# Patient Record
Sex: FEMALE | Employment: OTHER | URBAN - METROPOLITAN AREA
[De-identification: names, ages, dates, MRNs, and addresses within clinical notes are randomized per-mention and may not be internally consistent; named-entity substitution may affect disease eponyms.]

---

## 2022-11-23 ENCOUNTER — HOSPITAL ENCOUNTER (INPATIENT)
Age: 72
LOS: 4 days | Discharge: HOME | DRG: 481 | End: 2022-11-27
Attending: EMERGENCY MEDICINE | Admitting: FAMILY MEDICINE
Payer: OTHER GOVERNMENT

## 2022-11-23 ENCOUNTER — APPOINTMENT (OUTPATIENT)
Dept: CT IMAGING | Age: 72
DRG: 481 | End: 2022-11-23
Payer: OTHER GOVERNMENT

## 2022-11-23 ENCOUNTER — APPOINTMENT (OUTPATIENT)
Dept: GENERAL RADIOLOGY | Age: 72
DRG: 481 | End: 2022-11-23
Payer: OTHER GOVERNMENT
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
## 2022-11-23 DIAGNOSIS — S72.141A CLOSED DISPLACED INTERTROCHANTERIC FRACTURE OF RIGHT FEMUR, INITIAL ENCOUNTER (HCC): ICD-10-CM

## 2022-11-23 PROBLEM — D72.829 LEUKOCYTOSIS: Status: ACTIVE | Noted: 2022-11-23

## 2022-11-23 PROBLEM — E78.5 HYPERLIPIDEMIA: Status: ACTIVE | Noted: 2022-11-23

## 2022-11-23 PROBLEM — E87.20 ACIDOSIS: Status: ACTIVE | Noted: 2022-11-23

## 2022-11-23 LAB
ABO/RH: NORMAL
ALBUMIN SERPL-MCNC: 4.3 G/DL (ref 3.5–5.2)
ALP BLD-CCNC: 54 U/L (ref 35–104)
ALT SERPL-CCNC: 17 U/L (ref 0–32)
ANION GAP SERPL CALCULATED.3IONS-SCNC: 10 MMOL/L (ref 7–16)
ANTIBODY SCREEN: NORMAL
ANTIBODY SCREEN: NORMAL
AST SERPL-CCNC: 26 U/L (ref 0–31)
BASOPHILS ABSOLUTE: 0.03 E9/L (ref 0–0.2)
BASOPHILS RELATIVE PERCENT: 0.2 % (ref 0–2)
BILIRUB SERPL-MCNC: 0.3 MG/DL (ref 0–1.2)
BUN BLDV-MCNC: 15 MG/DL (ref 6–23)
CALCIUM SERPL-MCNC: 9.3 MG/DL (ref 8.6–10.2)
CHLORIDE BLD-SCNC: 107 MMOL/L (ref 98–107)
CO2: 20 MMOL/L (ref 22–29)
CREAT SERPL-MCNC: 0.8 MG/DL (ref 0.5–1)
EKG ATRIAL RATE: 64 BPM
EKG P AXIS: 38 DEGREES
EKG P-R INTERVAL: 124 MS
EKG Q-T INTERVAL: 428 MS
EKG QRS DURATION: 90 MS
EKG QTC CALCULATION (BAZETT): 441 MS
EKG R AXIS: 50 DEGREES
EKG T AXIS: 45 DEGREES
EKG VENTRICULAR RATE: 64 BPM
EOSINOPHILS ABSOLUTE: 0.03 E9/L (ref 0.05–0.5)
EOSINOPHILS RELATIVE PERCENT: 0.2 % (ref 0–6)
GFR SERPL CREATININE-BSD FRML MDRD: >60 ML/MIN/1.73
GLUCOSE BLD-MCNC: 120 MG/DL (ref 74–99)
HCT VFR BLD CALC: 40.2 % (ref 34–48)
HEMOGLOBIN: 13.6 G/DL (ref 11.5–15.5)
IMMATURE GRANULOCYTES #: 0.09 E9/L
IMMATURE GRANULOCYTES %: 0.6 % (ref 0–5)
INR BLD: 1
LYMPHOCYTES ABSOLUTE: 1.53 E9/L (ref 1.5–4)
LYMPHOCYTES RELATIVE PERCENT: 10.3 % (ref 20–42)
MCH RBC QN AUTO: 33.2 PG (ref 26–35)
MCHC RBC AUTO-ENTMCNC: 33.8 % (ref 32–34.5)
MCV RBC AUTO: 98 FL (ref 80–99.9)
MONOCYTES ABSOLUTE: 0.52 E9/L (ref 0.1–0.95)
MONOCYTES RELATIVE PERCENT: 3.5 % (ref 2–12)
NEUTROPHILS ABSOLUTE: 12.69 E9/L (ref 1.8–7.3)
NEUTROPHILS RELATIVE PERCENT: 85.2 % (ref 43–80)
PDW BLD-RTO: 12.9 FL (ref 11.5–15)
PLATELET # BLD: 256 E9/L (ref 130–450)
PMV BLD AUTO: 9.3 FL (ref 7–12)
POTASSIUM SERPL-SCNC: 4.4 MMOL/L (ref 3.5–5)
PROTHROMBIN TIME: 11.1 SEC (ref 9.3–12.4)
RBC # BLD: 4.1 E12/L (ref 3.5–5.5)
SODIUM BLD-SCNC: 137 MMOL/L (ref 132–146)
TOTAL CK: 124 U/L (ref 20–180)
TOTAL PROTEIN: 7 G/DL (ref 6.4–8.3)
WBC # BLD: 14.9 E9/L (ref 4.5–11.5)

## 2022-11-23 PROCEDURE — 96374 THER/PROPH/DIAG INJ IV PUSH: CPT

## 2022-11-23 PROCEDURE — 85025 COMPLETE CBC W/AUTO DIFF WBC: CPT

## 2022-11-23 PROCEDURE — 80053 COMPREHEN METABOLIC PANEL: CPT

## 2022-11-23 PROCEDURE — 1200000000 HC SEMI PRIVATE

## 2022-11-23 PROCEDURE — 86901 BLOOD TYPING SEROLOGIC RH(D): CPT

## 2022-11-23 PROCEDURE — 85610 PROTHROMBIN TIME: CPT

## 2022-11-23 PROCEDURE — 99223 1ST HOSP IP/OBS HIGH 75: CPT | Performed by: INTERNAL MEDICINE

## 2022-11-23 PROCEDURE — 93010 ELECTROCARDIOGRAM REPORT: CPT | Performed by: INTERNAL MEDICINE

## 2022-11-23 PROCEDURE — 99285 EMERGENCY DEPT VISIT HI MDM: CPT

## 2022-11-23 PROCEDURE — 6360000002 HC RX W HCPCS: Performed by: INTERNAL MEDICINE

## 2022-11-23 PROCEDURE — 82550 ASSAY OF CK (CPK): CPT

## 2022-11-23 PROCEDURE — 71045 X-RAY EXAM CHEST 1 VIEW: CPT

## 2022-11-23 PROCEDURE — 36415 COLL VENOUS BLD VENIPUNCTURE: CPT

## 2022-11-23 PROCEDURE — 86850 RBC ANTIBODY SCREEN: CPT

## 2022-11-23 PROCEDURE — 73502 X-RAY EXAM HIP UNI 2-3 VIEWS: CPT

## 2022-11-23 PROCEDURE — 6360000002 HC RX W HCPCS

## 2022-11-23 PROCEDURE — 6360000002 HC RX W HCPCS: Performed by: EMERGENCY MEDICINE

## 2022-11-23 PROCEDURE — 96375 TX/PRO/DX INJ NEW DRUG ADDON: CPT

## 2022-11-23 PROCEDURE — 2580000003 HC RX 258: Performed by: GENERAL ACUTE CARE HOSPITAL

## 2022-11-23 PROCEDURE — 86900 BLOOD TYPING SEROLOGIC ABO: CPT

## 2022-11-23 PROCEDURE — 93005 ELECTROCARDIOGRAM TRACING: CPT | Performed by: EMERGENCY MEDICINE

## 2022-11-23 RX ORDER — POLYETHYLENE GLYCOL 3350 17 G/17G
17 POWDER, FOR SOLUTION ORAL DAILY PRN
Status: DISCONTINUED | OUTPATIENT
Start: 2022-11-23 | End: 2022-11-24 | Stop reason: SDUPTHER

## 2022-11-23 RX ORDER — SODIUM CHLORIDE 9 MG/ML
INJECTION, SOLUTION INTRAVENOUS PRN
Status: DISCONTINUED | OUTPATIENT
Start: 2022-11-23 | End: 2022-11-27 | Stop reason: HOSPADM

## 2022-11-23 RX ORDER — LANOLIN ALCOHOL/MO/W.PET/CERES
100 CREAM (GRAM) TOPICAL DAILY
Status: DISCONTINUED | OUTPATIENT
Start: 2022-11-23 | End: 2022-11-27 | Stop reason: HOSPADM

## 2022-11-23 RX ORDER — ACETAMINOPHEN 325 MG/1
650 TABLET ORAL EVERY 6 HOURS PRN
Status: DISCONTINUED | OUTPATIENT
Start: 2022-11-23 | End: 2022-11-27 | Stop reason: HOSPADM

## 2022-11-23 RX ORDER — ACETAMINOPHEN 650 MG/1
650 SUPPOSITORY RECTAL EVERY 6 HOURS PRN
Status: DISCONTINUED | OUTPATIENT
Start: 2022-11-23 | End: 2022-11-27 | Stop reason: HOSPADM

## 2022-11-23 RX ORDER — SODIUM CHLORIDE 0.9 % (FLUSH) 0.9 %
5-40 SYRINGE (ML) INJECTION EVERY 12 HOURS SCHEDULED
Status: DISCONTINUED | OUTPATIENT
Start: 2022-11-23 | End: 2022-11-24

## 2022-11-23 RX ORDER — THIAMINE HYDROCHLORIDE 100 MG/ML
100 INJECTION, SOLUTION INTRAMUSCULAR; INTRAVENOUS ONCE
Status: COMPLETED | OUTPATIENT
Start: 2022-11-23 | End: 2022-11-23

## 2022-11-23 RX ORDER — ONDANSETRON 4 MG/1
4 TABLET, ORALLY DISINTEGRATING ORAL EVERY 8 HOURS PRN
Status: DISCONTINUED | OUTPATIENT
Start: 2022-11-23 | End: 2022-11-24 | Stop reason: SDUPTHER

## 2022-11-23 RX ORDER — SODIUM CHLORIDE 0.9 % (FLUSH) 0.9 %
5-40 SYRINGE (ML) INJECTION PRN
Status: DISCONTINUED | OUTPATIENT
Start: 2022-11-23 | End: 2022-11-24

## 2022-11-23 RX ORDER — MULTIVIT-MIN/IRON/FOLIC ACID/K 18-600-40
CAPSULE ORAL
COMMUNITY

## 2022-11-23 RX ORDER — FENTANYL CITRATE 50 UG/ML
50 INJECTION, SOLUTION INTRAMUSCULAR; INTRAVENOUS ONCE
Status: COMPLETED | OUTPATIENT
Start: 2022-11-23 | End: 2022-11-23

## 2022-11-23 RX ORDER — SODIUM CHLORIDE 0.9 % (FLUSH) 0.9 %
5-40 SYRINGE (ML) INJECTION EVERY 12 HOURS SCHEDULED
Status: DISCONTINUED | OUTPATIENT
Start: 2022-11-23 | End: 2022-11-27 | Stop reason: HOSPADM

## 2022-11-23 RX ORDER — ATORVASTATIN CALCIUM 20 MG/1
20 TABLET, FILM COATED ORAL DAILY
COMMUNITY

## 2022-11-23 RX ORDER — ONDANSETRON 2 MG/ML
4 INJECTION INTRAMUSCULAR; INTRAVENOUS EVERY 6 HOURS PRN
Status: DISCONTINUED | OUTPATIENT
Start: 2022-11-23 | End: 2022-11-24 | Stop reason: SDUPTHER

## 2022-11-23 RX ADMIN — FENTANYL CITRATE 50 MCG: 50 INJECTION INTRAMUSCULAR; INTRAVENOUS at 11:34

## 2022-11-23 RX ADMIN — THIAMINE HYDROCHLORIDE 100 MG: 100 INJECTION, SOLUTION INTRAMUSCULAR; INTRAVENOUS at 16:21

## 2022-11-23 RX ADMIN — HYDROMORPHONE HYDROCHLORIDE 0.5 MG: 0.5 INJECTION, SOLUTION INTRAMUSCULAR; INTRAVENOUS; SUBCUTANEOUS at 18:04

## 2022-11-23 RX ADMIN — SODIUM CHLORIDE, PRESERVATIVE FREE 10 ML: 5 INJECTION INTRAVENOUS at 20:59

## 2022-11-23 RX ADMIN — HYDROMORPHONE HYDROCHLORIDE 1 MG: 1 INJECTION, SOLUTION INTRAMUSCULAR; INTRAVENOUS; SUBCUTANEOUS at 14:56

## 2022-11-23 RX ADMIN — HYDROMORPHONE HYDROCHLORIDE 1 MG: 1 INJECTION, SOLUTION INTRAMUSCULAR; INTRAVENOUS; SUBCUTANEOUS at 12:34

## 2022-11-23 RX ADMIN — HYDROMORPHONE HYDROCHLORIDE 0.5 MG: 0.5 INJECTION, SOLUTION INTRAMUSCULAR; INTRAVENOUS; SUBCUTANEOUS at 21:00

## 2022-11-23 ASSESSMENT — ENCOUNTER SYMPTOMS
CONSTIPATION: 0
BLOOD IN STOOL: 0
ABDOMINAL PAIN: 0
DIARRHEA: 0
SHORTNESS OF BREATH: 0
VOMITING: 0
CHEST TIGHTNESS: 0
NAUSEA: 0

## 2022-11-23 ASSESSMENT — PAIN DESCRIPTION - LOCATION
LOCATION: HIP

## 2022-11-23 ASSESSMENT — LIFESTYLE VARIABLES
HOW OFTEN DO YOU HAVE A DRINK CONTAINING ALCOHOL: 4 OR MORE TIMES A WEEK
HOW MANY STANDARD DRINKS CONTAINING ALCOHOL DO YOU HAVE ON A TYPICAL DAY: 1 OR 2

## 2022-11-23 ASSESSMENT — PAIN DESCRIPTION - ORIENTATION
ORIENTATION: RIGHT

## 2022-11-23 ASSESSMENT — PAIN SCALES - GENERAL
PAINLEVEL_OUTOF10: 9
PAINLEVEL_OUTOF10: 10
PAINLEVEL_OUTOF10: 9
PAINLEVEL_OUTOF10: 10
PAINLEVEL_OUTOF10: 10

## 2022-11-23 ASSESSMENT — PAIN DESCRIPTION - DESCRIPTORS
DESCRIPTORS: ACHING
DESCRIPTORS: SORE;ACHING

## 2022-11-23 NOTE — H&P
[place] [id number]  Resident History and Physical      CHIEF COMPLAINT:    Chief Complaint   Patient presents with    Loss of Consciousness     Pt became dizzy and had a syncopal episode. Denies hitting head. Now C/O right hip pain. History of Present Illness:   Manish Wesley  is a 67 y.o. female patient of [facility]  with a pertinent PMHx of hyperlipidemia, tobacco abuse, daily alcohol use who presented to the ER from home with granddaughter with chief complaint of loss of consciousness. Patient states that she was at home, which is being renovated, and was walking through the kitchen when she tripped on a board lying on the ground falling and landing on her right hip. She is adamant that she DID NOT lose consciousness. She denies any presyncopal symptoms or any. Of not being fully alert and aware. She did however, tried to stand after her fall and states that the pain was so severe she became lightheaded and nauseous and felt as if she was going to faint but did not. She denies any head trauma during the fall. She denies any pain anywhere other than the right hip. Pain is exacerbated by movement and does not radiate. She is not having any pain in her back or bowel/bladder incontinence. She denies any other acute concerns. ED course: Vitals were remarkable for elevated blood pressure 148/77. Labs were remarkable for leukocytosis of 14.9. CXR was remarkable for normal chest x-ray. X-ray of the right hip demonstrated mildly displaced intertrochanteric fracture with mild varus angulation. EKG was remarkable for normal EKG, normal sinus rhythm. Patient did receive fentanyl 50 mcg x 1 as well as Dilaudid 1 mg x 1 in the emergency department. Additionally, she refused CT of the head in the emergency department as she stated she did not have any head trauma and did not feel that she needed that image.   Internal Medicine was consulted to admit the patient for fracture of the right hip    ROS:     Review of Systems   Constitutional:  Negative for chills and fever. Eyes:  Negative for visual disturbance. Respiratory:  Negative for chest tightness and shortness of breath. Cardiovascular:  Negative for chest pain and palpitations. Gastrointestinal:  Negative for abdominal pain, blood in stool, constipation, diarrhea, nausea and vomiting. Genitourinary:  Negative for dysuria and hematuria. Musculoskeletal:  Positive for arthralgias and myalgias. Negative for neck pain. Skin:  Negative for wound. Neurological:  Negative for dizziness, syncope and light-headedness. Past Medical History:   Diagnosis Date    Hyperlipidemia          Past Surgical History:   Procedure Laterality Date    NECK MASS EXCISION Left     Around 1972       Medications Prior to Admission:    Prior to Admission medications    Not on File        Allergies:   Patient has no known allergies. Social History:    reports that she has been smoking cigarettes. She has a 25.00 pack-year smoking history. She does not have any smokeless tobacco history on file. She reports current alcohol use. She reports that she does not use drugs. Family History:   family history is not on file. PHYSICAL EXAM:    Vitals:  BP (!) 148/77   Pulse 73   Temp 97.8 °F (36.6 °C)   Resp 17   Wt 144 lb (65.3 kg)   SpO2 98%     Physical Exam  Vitals reviewed. Constitutional:       General: She is not in acute distress. Appearance: Normal appearance. HENT:      Head: Normocephalic. Right Ear: External ear normal.      Left Ear: External ear normal.      Nose:      Comments: Small abrasion over tip of the nose     Mouth/Throat:      Mouth: Mucous membranes are moist.   Eyes:      General: No scleral icterus. Extraocular Movements: Extraocular movements intact. Conjunctiva/sclera: Conjunctivae normal.   Cardiovascular:      Rate and Rhythm: Normal rate and regular rhythm.       Heart sounds: Normal heart sounds. No murmur heard. No friction rub. No gallop. Pulmonary:      Effort: Pulmonary effort is normal.      Breath sounds: Normal breath sounds. No wheezing or rales. Abdominal:      General: Abdomen is flat. Palpations: Abdomen is soft. There is no mass. Tenderness: There is no abdominal tenderness. There is no guarding. Musculoskeletal:         General: Signs of injury present. Normal range of motion. Cervical back: Normal range of motion and neck supple. Right lower leg: No edema. Left lower leg: No edema. Comments: Creased range of motion right hip, pain on movement secondary to confirmed fracture on imaging   Skin:     General: Skin is warm and dry. Neurological:      General: No focal deficit present. Mental Status: She is alert.    Psychiatric:         Mood and Affect: Mood normal.       LABS:  Recent Results (from the past 24 hour(s))   EKG 12 Lead    Collection Time: 11/23/22 12:05 PM   Result Value Ref Range    Ventricular Rate 64 BPM    Atrial Rate 64 BPM    P-R Interval 124 ms    QRS Duration 90 ms    Q-T Interval 428 ms    QTc Calculation (Bazett) 441 ms    P Axis 38 degrees    R Axis 50 degrees    T Axis 45 degrees   CMP    Collection Time: 11/23/22 12:09 PM   Result Value Ref Range    Sodium 137 132 - 146 mmol/L    Potassium 4.4 3.5 - 5.0 mmol/L    Chloride 107 98 - 107 mmol/L    CO2 20 (L) 22 - 29 mmol/L    Anion Gap 10 7 - 16 mmol/L    Glucose 120 (H) 74 - 99 mg/dL    BUN 15 6 - 23 mg/dL    Creatinine 0.8 0.5 - 1.0 mg/dL    Est, Glom Filt Rate >60 >=60 mL/min/1.73    Calcium 9.3 8.6 - 10.2 mg/dL    Total Protein 7.0 6.4 - 8.3 g/dL    Albumin 4.3 3.5 - 5.2 g/dL    Total Bilirubin 0.3 0.0 - 1.2 mg/dL    Alkaline Phosphatase 54 35 - 104 U/L    ALT 17 0 - 32 U/L    AST 26 0 - 31 U/L   CBC with Auto Differential    Collection Time: 11/23/22 12:09 PM   Result Value Ref Range    WBC 14.9 (H) 4.5 - 11.5 E9/L    RBC 4.10 3.50 - 5.50 E12/L    Hemoglobin 13.6 11.5 - 15.5 g/dL    Hematocrit 40.2 34.0 - 48.0 %    MCV 98.0 80.0 - 99.9 fL    MCH 33.2 26.0 - 35.0 pg    MCHC 33.8 32.0 - 34.5 %    RDW 12.9 11.5 - 15.0 fL    Platelets 642 017 - 407 E9/L    MPV 9.3 7.0 - 12.0 fL    Neutrophils % 85.2 (H) 43.0 - 80.0 %    Immature Granulocytes % 0.6 0.0 - 5.0 %    Lymphocytes % 10.3 (L) 20.0 - 42.0 %    Monocytes % 3.5 2.0 - 12.0 %    Eosinophils % 0.2 0.0 - 6.0 %    Basophils % 0.2 0.0 - 2.0 %    Neutrophils Absolute 12.69 (H) 1.80 - 7.30 E9/L    Immature Granulocytes # 0.09 E9/L    Lymphocytes Absolute 1.53 1.50 - 4.00 E9/L    Monocytes Absolute 0.52 0.10 - 0.95 E9/L    Eosinophils Absolute 0.03 (L) 0.05 - 0.50 E9/L    Basophils Absolute 0.03 0.00 - 0.20 E9/L   Protime-INR    Collection Time: 11/23/22 12:09 PM   Result Value Ref Range    Protime 11.1 9.3 - 12.4 sec    INR 1.0    TYPE AND SCREEN    Collection Time: 11/23/22 12:15 PM   Result Value Ref Range    ABO/Rh B POS     Antibody Screen NEG        CT Head W/O Contrast         XR CHEST PORTABLE   Final Result   No acute process. XR HIP 2-3 VW W PELVIS RIGHT   Final Result   Mildly displaced intertrochanteric fracture right hip with mild varus   angulation.              ASSESSMENT/PLAN:      Active Hospital Problems    Diagnosis Date Noted    Intertrochanteric fracture of right femur, closed, initial encounter [facility]) Merlinda Eke 11/23/2022     Priority: Medium       Intertrochanteric fracture right hip  Hold DVT prophylaxis for now, defer to orthopedic surgery  Keep diet n.p.o. for now, if patient is to wait until tomorrow for surgery may eat dinner and be n.p.o. after midnight  Pain control with Dilaudid 0.5 mg every 3 hours as needed, defer any alterations in pain control orders to orthopedic service  Incentive spirometer to optimize pulmonary function as best as possible prior to surgery as well as afterwards to prevent atelectasis  Strict bedrest for now, defer further ambulatory needs and guidelines to orthopedic surgery  Orthopedic surgery following, appreciate input    Leukocytosis  White blood cells 14.9 on admission labs, likely inflammatory versus infectious versus atelectasis  Patient without symptoms or physical exam concerning for infection or atelectasis, will continue to monitor with daily CBC    Alcohol use disorder  Patient drinks alcohol daily, endorses greater than 7 beers per week. However, she denies ever having any withdrawal symptoms and does not appear tremulous on exam.  Will order CIWA protocol to ensure patient does not have any seizures or withdrawal during hospitalization. Pain Control: Dilaudid 0.5 mg every 3 hours as needed  DVT ppx: None, defer to orthopedic surgery  GI ppx: None  Code Status: Full  Diet: NPO      Case discussed with attending, Dr. Rufino Almaraz MD   Family Medicine Resident Physician PGY-1  11/23/2022   HOSPITALIST ATTENDING PHYSICIAN NOTE 11/23/2022 1626PM:    Details of the evaluation - subjective assessment (including medication profile, past medical, family and social history when applicable), examination, review of lab and test data, diagnostic impressions and medical decision making - performed by Rubina Mao MD, were discussed with me on the date of service and I agree with clinical information herein unless otherwise noted. The patient has been evaluated by me personally earlier today. Pt reports no fevers, chills,n/v. PHYSICAL EXAM:    Vitals:  BP (!) 142/67   Pulse 75   Temp 97.3 °F (36.3 °C) (Oral)   Resp 16   Wt 144 lb (65.3 kg)   SpO2 97%     General:  Appears comfortable. Answers questions appropriately and cooperative with exam  HEENT:  Mucous membranes moist. No erythema, rhinorrhea, or post-nasal drip noted. Neck:  No carotid bruits. Heart:  Rhythm regular at rate of 76  Lungs:  CTA. No wheeze, rales, or rhonchi  Abdomen:  Positive bowel sounds positive. Soft. Non-tender.  No guarding, rebound or rigidity. Breast/Rectal/Genitourinary: not pertinent. Extremities:  Negative for lower extremity edema  Skin:  Warm and dry  Vascular: 2/4 Dorsalis Pedis pulses bilaterally. Neuro:  limited due to pt's status but otherwise no focal changes noted. I agree with the assessment and plan of Collin Adame MD    Right mildly displaced intertrochanteric fx of right hip  Leukocytosis  acidosis  Alcohol abuse  Hyperlipidemia  Elevated bp without dx of htn    Pt denies any active cp or cardiac symptoms. Pt's recent smoking of today does prevent some risk from pulmonary standpoint but would not delay surgery at this time. Will monitor pt closely for any changes. Electronically signed by Nereida Reaves D.O.   Hospitalist  [facility] at [facility]

## 2022-11-23 NOTE — ED NOTES
Pts band was verified and then afterwards the patient realized her birth date was wrong after verifying with her.       Savita D eLeón, MAURICIO  11/23/22 [id number]

## 2022-11-23 NOTE — ED PROVIDER NOTES
Department of Emergency Medicine   ED  Provider Note  Admit Date/RoomTime: 11/23/2022 11:11 AM  ED Room: 22/22          History of Present Illness:  11/23/22, Time: 11:20 AM EILEEN García is a 67 y.o. female presenting to the ED for right hip pain after a fall, beginning less than 1 hour prior to arrival.  The complaint has been persistent, severe in severity, and worsened by movement of right leg. Patient states that she was walking through the kitchen when she tripped and fell forward and landed on her right hip. Patient states that she immediately felt pain to the right hip, does not feel that she struck her head nor did she lose consciousness. She stated that when she was trying to get up the pain became so severe that she did briefly pass out at that time. Family summoned EMS and patient was brought to ED. She is complaining of a right hip pain with no significant radiation but worsens with any movement of the right leg. She denies any neck or back pain, no headache. Review of Systems:   Pertinent positives and negatives are stated within HPI, all other systems reviewed and are negative.        --------------------------------------------- PAST HISTORY ---------------------------------------------  Past Medical History:  has no past medical history on file. Past Surgical History:  has no past surgical history on file. Social History:      Family History: family history is not on file. . Unless otherwise noted, family history is non contributory    The patients home medications have been reviewed. Allergies: Patient has no known allergies.         ---------------------------------------------------PHYSICAL EXAM--------------------------------------    Constitutional/General: Alert and oriented x3  Head: Normocephalic and atraumatic  Eyes: PERRL, EOMI, sclera non icteric  Mouth: Oropharynx clear, handling secretions, no trismus, no asymmetry of the posterior oropharynx or uvular edema  Neck: Supple, full ROM, no stridor, no meningeal signs  Respiratory: Lungs clear to auscultation bilaterally, no wheezes, rales, or rhonchi. Not in respiratory distress  Cardiovascular:  Regular rate. Regular rhythm. No murmurs, no aortic murmurs, no gallops, or rubs. 2+ distal pulses. Equal extremity pulses. Chest: No chest wall tenderness  GI:  Abdomen Soft, Non tender, Non distended. No rebound, guarding, or rigidity. No pulsatile masses. Musculoskeletal: Shortening and outward rotation of the right lower extremity, tenderness to the right anterior and lateral hip. Warm and well perfused, no clubbing, cyanosis, or edema. Capillary refill <3 seconds  Integument: skin warm and dry. No rashes. Neurologic: GCS 15, no focal deficits, symmetric strength 5/5 in the upper and lower extremities bilaterally  Psychiatric: Normal Affect          -------------------------------------------------- RESULTS -------------------------------------------------  I have personally reviewed all laboratory and imaging results for this patient. Results are listed below.      LABS: (Lab results interpreted by me)  Results for orders placed or performed during the hospital encounter of 11/23/22   CMP   Result Value Ref Range    Sodium 137 132 - 146 mmol/L    Potassium 4.4 3.5 - 5.0 mmol/L    Chloride 107 98 - 107 mmol/L    CO2 20 (L) 22 - 29 mmol/L    Anion Gap 10 7 - 16 mmol/L    Glucose 120 (H) 74 - 99 mg/dL    BUN 15 6 - 23 mg/dL    Creatinine 0.8 0.5 - 1.0 mg/dL    Est, Glom Filt Rate >60 >=60 mL/min/1.73    Calcium 9.3 8.6 - 10.2 mg/dL    Total Protein 7.0 6.4 - 8.3 g/dL    Albumin 4.3 3.5 - 5.2 g/dL    Total Bilirubin 0.3 0.0 - 1.2 mg/dL    Alkaline Phosphatase 54 35 - 104 U/L    ALT 17 0 - 32 U/L    AST 26 0 - 31 U/L   CBC with Auto Differential   Result Value Ref Range    WBC 14.9 (H) 4.5 - 11.5 E9/L    RBC 4.10 3.50 - 5.50 E12/L    Hemoglobin 13.6 11.5 - 15.5 g/dL    Hematocrit 40.2 34.0 - 48.0 %    MCV 98.0 80.0 - 99.9 fL    MCH 33.2 26.0 - 35.0 pg    MCHC 33.8 32.0 - 34.5 %    RDW 12.9 11.5 - 15.0 fL    Platelets 464 304 - 679 E9/L    MPV 9.3 7.0 - 12.0 fL    Neutrophils % 85.2 (H) 43.0 - 80.0 %    Immature Granulocytes % 0.6 0.0 - 5.0 %    Lymphocytes % 10.3 (L) 20.0 - 42.0 %    Monocytes % 3.5 2.0 - 12.0 %    Eosinophils % 0.2 0.0 - 6.0 %    Basophils % 0.2 0.0 - 2.0 %    Neutrophils Absolute 12.69 (H) 1.80 - 7.30 E9/L    Immature Granulocytes # 0.09 E9/L    Lymphocytes Absolute 1.53 1.50 - 4.00 E9/L    Monocytes Absolute 0.52 0.10 - 0.95 E9/L    Eosinophils Absolute 0.03 (L) 0.05 - 0.50 E9/L    Basophils Absolute 0.03 0.00 - 0.20 E9/L   EKG 12 Lead   Result Value Ref Range    Ventricular Rate 64 BPM    Atrial Rate 64 BPM    P-R Interval 124 ms    QRS Duration 90 ms    Q-T Interval 428 ms    QTc Calculation (Bazett) 441 ms    P Axis 38 degrees    R Axis 50 degrees    T Axis 45 degrees   ,       RADIOLOGY:  Interpreted by Radiologist unless otherwise specified  XR CHEST PORTABLE   Final Result   No acute process. XR HIP 2-3 VW W PELVIS RIGHT   Final Result   Mildly displaced intertrochanteric fracture right hip with mild varus   angulation.          CT Head W/O Contrast    (Results Pending)         EKG Interpretation  Interpreted by emergency department physician, Dr. Elia Guzman    Date of EK22  Time: 1205    Rhythm: normal sinus   Rate: 64  Axis: normal  Conduction: normal  ST Segments: no acute change  T Waves: no acute change    Clinical Impression: No findings suggestive of acute ischemia or injury  Comparison to prior EKG: no previous EKG      ------------------------- NURSING NOTES AND VITALS REVIEWED ---------------------------   The nursing notes within the ED encounter and vital signs as below have been reviewed by myself  BP (!) 141/66   Pulse 61   Temp 97.8 °F (36.6 °C)   Wt 144 lb (65.3 kg)     Oxygen Saturation Interpretation: Normal    The patients available past medical records and past encounters were reviewed. ------------------------------ ED COURSE/MEDICAL DECISION MAKING----------------------  Medications   fentaNYL (SUBLIMAZE) injection 50 mcg (50 mcg IntraVENous Given 11/23/22 1134)   HYDROmorphone (DILAUDID) injection 1 mg (1 mg IntraVENous Given 11/23/22 1234)           The cardiac monitor revealed sinus rhythm with a heart rate in the 60s as interpreted by me. The cardiac monitor was ordered secondary to the patient's chest pain and to monitor for patient for dysrhythmia. CPT [id number]           Medical Decision Making:     I, Dr. Rasheeda Pineda, am the primary provider of record    68-year-old female presenting with right hip pain after mechanical fall. Patient did have a syncopal episode after she tried to move and get up, stating that the pain became significantly more intense which caused her to briefly lose consciousness. She arrives neurologically intact and hemodynamically stable. Concern for acute fracture as her right lower extremity is shortened and rotated. Laboratory testing, EKG, chest x-ray and CT head were performed not so much for the syncopal episode due to pain but preoperative clearance. EKG shows no findings suggestive of acute ischemia or injury. Chest x-ray is unremarkable. X-ray of the hip shows a mildly displaced intertrochanteric right hip fracture. Leukocytosis 14.9, no anemia. Metabolic panel is within acceptable limits. Patient was more comfortable after fentanyl as well as Dilaudid. Patient will be admitted to hospitalist service, orthopedic consult is still pending. Patient is refusing CT head, stating she did not strike her head, stating that she fainted from pain. She understands the risks involved with undiagnosed head injury, however she has low risk for [place] CT head rule. Re-Evaluations:   This patient's ED course included: a personal history and physicial examination, re-evaluation prior to disposition, IV medications, cardiac monitoring, and continuous pulse oximetry    This patient has remained hemodynamically stable and been closely monitored during their ED course. Consultations:  Orthopedic surgery    Consult pending        Counseling: The emergency provider has spoken with the patient and family member granddaughter and discussed todays results, in addition to providing specific details for the plan of care and counseling regarding the diagnosis and prognosis. Questions are answered at this time and they are agreeable with the plan.       --------------------------------- IMPRESSION AND DISPOSITION ---------------------------------    IMPRESSION  1. Fall, initial encounter    2. Closed displaced intertrochanteric fracture of right femur, initial encounter ([facility] [address]        DISPOSITION  Disposition: Admit to med/surg floor  Patient condition is stable        NOTE: This report was transcribed using voice recognition software.  Every effort was made to ensure accuracy; however, inadvertent computerized transcription errors may be present        Elodia Davila,   11/23/22 509 Horacio Reed, DO  11/23/22 [id number]

## 2022-11-23 NOTE — ED NOTES
Pt redrawn and reverified. Pt states she did not want the man in the room to know her birth date and how old she was.       Marley Montgomery, MAURICIO  11/23/22 [id number]

## 2022-11-24 ENCOUNTER — APPOINTMENT (OUTPATIENT)
Dept: GENERAL RADIOLOGY | Age: 72
DRG: 481 | End: 2022-11-24
Payer: OTHER GOVERNMENT

## 2022-11-24 ENCOUNTER — ANESTHESIA (OUTPATIENT)
Dept: OPERATING ROOM | Age: 72
DRG: 481 | End: 2022-11-24
Payer: OTHER GOVERNMENT

## 2022-11-24 ENCOUNTER — ANESTHESIA EVENT (OUTPATIENT)
Dept: OPERATING ROOM | Age: 72
DRG: 481 | End: 2022-11-24
Payer: OTHER GOVERNMENT

## 2022-11-24 PROBLEM — F10.10 ALCOHOL ABUSE: Status: ACTIVE | Noted: 2022-11-24

## 2022-11-24 LAB
ANION GAP SERPL CALCULATED.3IONS-SCNC: 11 MMOL/L (ref 7–16)
BASOPHILS ABSOLUTE: 0.03 E9/L (ref 0–0.2)
BASOPHILS RELATIVE PERCENT: 0.3 % (ref 0–2)
BUN BLDV-MCNC: 14 MG/DL (ref 6–23)
CALCIUM SERPL-MCNC: 9.1 MG/DL (ref 8.6–10.2)
CHLORIDE BLD-SCNC: 107 MMOL/L (ref 98–107)
CO2: 19 MMOL/L (ref 22–29)
CREAT SERPL-MCNC: 0.7 MG/DL (ref 0.5–1)
EOSINOPHILS ABSOLUTE: 0.02 E9/L (ref 0.05–0.5)
EOSINOPHILS RELATIVE PERCENT: 0.2 % (ref 0–6)
GFR SERPL CREATININE-BSD FRML MDRD: >60 ML/MIN/1.73
GLUCOSE BLD-MCNC: 122 MG/DL (ref 74–99)
HCT VFR BLD CALC: 37.4 % (ref 34–48)
HEMOGLOBIN: 12.4 G/DL (ref 11.5–15.5)
IMMATURE GRANULOCYTES #: 0.04 E9/L
IMMATURE GRANULOCYTES %: 0.4 % (ref 0–5)
LYMPHOCYTES ABSOLUTE: 2.43 E9/L (ref 1.5–4)
LYMPHOCYTES RELATIVE PERCENT: 22.7 % (ref 20–42)
MCH RBC QN AUTO: 32.5 PG (ref 26–35)
MCHC RBC AUTO-ENTMCNC: 33.2 % (ref 32–34.5)
MCV RBC AUTO: 98.2 FL (ref 80–99.9)
MONOCYTES ABSOLUTE: 0.59 E9/L (ref 0.1–0.95)
MONOCYTES RELATIVE PERCENT: 5.5 % (ref 2–12)
NEUTROPHILS ABSOLUTE: 7.59 E9/L (ref 1.8–7.3)
NEUTROPHILS RELATIVE PERCENT: 70.9 % (ref 43–80)
PDW BLD-RTO: 13.1 FL (ref 11.5–15)
PLATELET # BLD: 230 E9/L (ref 130–450)
PMV BLD AUTO: 9.7 FL (ref 7–12)
POTASSIUM REFLEX MAGNESIUM: 4.1 MMOL/L (ref 3.5–5)
RBC # BLD: 3.81 E12/L (ref 3.5–5.5)
SODIUM BLD-SCNC: 137 MMOL/L (ref 132–146)
WBC # BLD: 10.7 E9/L (ref 4.5–11.5)

## 2022-11-24 PROCEDURE — 7100000000 HC PACU RECOVERY - FIRST 15 MIN: Performed by: GENERAL ACUTE CARE HOSPITAL

## 2022-11-24 PROCEDURE — 6370000000 HC RX 637 (ALT 250 FOR IP): Performed by: NURSE PRACTITIONER

## 2022-11-24 PROCEDURE — 2580000003 HC RX 258: Performed by: GENERAL ACUTE CARE HOSPITAL

## 2022-11-24 PROCEDURE — 2580000003 HC RX 258

## 2022-11-24 PROCEDURE — 7100000001 HC PACU RECOVERY - ADDTL 15 MIN: Performed by: GENERAL ACUTE CARE HOSPITAL

## 2022-11-24 PROCEDURE — 6370000000 HC RX 637 (ALT 250 FOR IP)

## 2022-11-24 PROCEDURE — C1713 ANCHOR/SCREW BN/BN,TIS/BN: HCPCS | Performed by: GENERAL ACUTE CARE HOSPITAL

## 2022-11-24 PROCEDURE — 76942 ECHO GUIDE FOR BIOPSY: CPT | Performed by: ANESTHESIOLOGY

## 2022-11-24 PROCEDURE — 6360000002 HC RX W HCPCS

## 2022-11-24 PROCEDURE — 87081 CULTURE SCREEN ONLY: CPT

## 2022-11-24 PROCEDURE — 2500000003 HC RX 250 WO HCPCS: Performed by: GENERAL ACUTE CARE HOSPITAL

## 2022-11-24 PROCEDURE — 6360000002 HC RX W HCPCS: Performed by: GENERAL ACUTE CARE HOSPITAL

## 2022-11-24 PROCEDURE — 2709999900 HC NON-CHARGEABLE SUPPLY: Performed by: GENERAL ACUTE CARE HOSPITAL

## 2022-11-24 PROCEDURE — 2580000003 HC RX 258: Performed by: ANESTHESIOLOGY

## 2022-11-24 PROCEDURE — 0QS604Z REPOSITION RIGHT UPPER FEMUR WITH INTERNAL FIXATION DEVICE, OPEN APPROACH: ICD-10-PCS | Performed by: GENERAL ACUTE CARE HOSPITAL

## 2022-11-24 PROCEDURE — A4216 STERILE WATER/SALINE, 10 ML: HCPCS | Performed by: ANESTHESIOLOGY

## 2022-11-24 PROCEDURE — 3600000003 HC SURGERY LEVEL 3 BASE: Performed by: GENERAL ACUTE CARE HOSPITAL

## 2022-11-24 PROCEDURE — 6370000000 HC RX 637 (ALT 250 FOR IP): Performed by: GENERAL ACUTE CARE HOSPITAL

## 2022-11-24 PROCEDURE — 99232 SBSQ HOSP IP/OBS MODERATE 35: CPT | Performed by: INTERNAL MEDICINE

## 2022-11-24 PROCEDURE — 85025 COMPLETE CBC W/AUTO DIFF WBC: CPT

## 2022-11-24 PROCEDURE — 2500000003 HC RX 250 WO HCPCS: Performed by: ANESTHESIOLOGY

## 2022-11-24 PROCEDURE — 2500000003 HC RX 250 WO HCPCS

## 2022-11-24 PROCEDURE — 3600000013 HC SURGERY LEVEL 3 ADDTL 15MIN: Performed by: GENERAL ACUTE CARE HOSPITAL

## 2022-11-24 PROCEDURE — 1200000000 HC SEMI PRIVATE

## 2022-11-24 PROCEDURE — 3700000000 HC ANESTHESIA ATTENDED CARE: Performed by: GENERAL ACUTE CARE HOSPITAL

## 2022-11-24 PROCEDURE — C1769 GUIDE WIRE: HCPCS | Performed by: GENERAL ACUTE CARE HOSPITAL

## 2022-11-24 PROCEDURE — 3209999900 FLUORO FOR SURGICAL PROCEDURES

## 2022-11-24 PROCEDURE — 2720000010 HC SURG SUPPLY STERILE: Performed by: GENERAL ACUTE CARE HOSPITAL

## 2022-11-24 PROCEDURE — 3700000001 HC ADD 15 MINUTES (ANESTHESIA): Performed by: GENERAL ACUTE CARE HOSPITAL

## 2022-11-24 PROCEDURE — 6360000002 HC RX W HCPCS: Performed by: ANESTHESIOLOGY

## 2022-11-24 PROCEDURE — 80048 BASIC METABOLIC PNL TOTAL CA: CPT

## 2022-11-24 PROCEDURE — 36415 COLL VENOUS BLD VENIPUNCTURE: CPT

## 2022-11-24 DEVICE — 5.0MM TI LOCKING SCREW W/T25 STARDRIVE 34MM F/IM NAIL-STER: Type: IMPLANTABLE DEVICE | Site: HIP | Status: FUNCTIONAL

## 2022-11-24 DEVICE — TFNA FENESTRATED SCREW 95MM - STERILE
Type: IMPLANTABLE DEVICE | Site: HIP | Status: FUNCTIONAL
Brand: TFN-ADVANCE

## 2022-11-24 DEVICE — 11MM/125 DEG TI CANN TFNA 170MM - STERILE
Type: IMPLANTABLE DEVICE | Site: HIP | Status: FUNCTIONAL
Brand: TFN-ADVANCE

## 2022-11-24 RX ORDER — PHENYLEPHRINE HCL IN 0.9% NACL 1 MG/10 ML
SYRINGE (ML) INTRAVENOUS PRN
Status: DISCONTINUED | OUTPATIENT
Start: 2022-11-24 | End: 2022-11-24 | Stop reason: SDUPTHER

## 2022-11-24 RX ORDER — KETAMINE HYDROCHLORIDE 10 MG/ML
INJECTION, SOLUTION INTRAMUSCULAR; INTRAVENOUS PRN
Status: DISCONTINUED | OUTPATIENT
Start: 2022-11-24 | End: 2022-11-24 | Stop reason: SDUPTHER

## 2022-11-24 RX ORDER — POLYETHYLENE GLYCOL 3350 17 G/17G
17 POWDER, FOR SOLUTION ORAL DAILY
Status: DISCONTINUED | OUTPATIENT
Start: 2022-11-24 | End: 2022-11-27 | Stop reason: HOSPADM

## 2022-11-24 RX ORDER — MIDAZOLAM HYDROCHLORIDE 1 MG/ML
INJECTION INTRAMUSCULAR; INTRAVENOUS
Status: COMPLETED
Start: 2022-11-24 | End: 2022-11-24

## 2022-11-24 RX ORDER — ONDANSETRON 2 MG/ML
4 INJECTION INTRAMUSCULAR; INTRAVENOUS EVERY 6 HOURS PRN
Status: DISCONTINUED | OUTPATIENT
Start: 2022-11-24 | End: 2022-11-24 | Stop reason: SDUPTHER

## 2022-11-24 RX ORDER — ROCURONIUM BROMIDE 10 MG/ML
INJECTION, SOLUTION INTRAVENOUS PRN
Status: DISCONTINUED | OUTPATIENT
Start: 2022-11-24 | End: 2022-11-24 | Stop reason: SDUPTHER

## 2022-11-24 RX ORDER — SODIUM CHLORIDE 0.9 % (FLUSH) 0.9 %
5-40 SYRINGE (ML) INJECTION EVERY 12 HOURS SCHEDULED
Status: DISCONTINUED | OUTPATIENT
Start: 2022-11-24 | End: 2022-11-27 | Stop reason: HOSPADM

## 2022-11-24 RX ORDER — ATORVASTATIN CALCIUM 20 MG/1
20 TABLET, FILM COATED ORAL NIGHTLY
Status: DISCONTINUED | OUTPATIENT
Start: 2022-11-24 | End: 2022-11-27 | Stop reason: HOSPADM

## 2022-11-24 RX ORDER — LIDOCAINE HYDROCHLORIDE 20 MG/ML
INJECTION, SOLUTION EPIDURAL; INFILTRATION; INTRACAUDAL; PERINEURAL PRN
Status: DISCONTINUED | OUTPATIENT
Start: 2022-11-24 | End: 2022-11-24 | Stop reason: SDUPTHER

## 2022-11-24 RX ORDER — FAMOTIDINE 10 MG/ML
INJECTION, SOLUTION INTRAVENOUS
Status: DISPENSED
Start: 2022-11-24 | End: 2022-11-24

## 2022-11-24 RX ORDER — NEOSTIGMINE METHYLSULFATE 1 MG/ML
INJECTION, SOLUTION INTRAVENOUS PRN
Status: DISCONTINUED | OUTPATIENT
Start: 2022-11-24 | End: 2022-11-24 | Stop reason: SDUPTHER

## 2022-11-24 RX ORDER — SODIUM CHLORIDE 0.9 % (FLUSH) 0.9 %
5-40 SYRINGE (ML) INJECTION PRN
Status: DISCONTINUED | OUTPATIENT
Start: 2022-11-24 | End: 2022-11-24 | Stop reason: HOSPADM

## 2022-11-24 RX ORDER — DIPHENHYDRAMINE HYDROCHLORIDE 50 MG/ML
12.5 INJECTION INTRAMUSCULAR; INTRAVENOUS
Status: DISCONTINUED | OUTPATIENT
Start: 2022-11-24 | End: 2022-11-24 | Stop reason: HOSPADM

## 2022-11-24 RX ORDER — FENTANYL CITRATE 50 UG/ML
INJECTION, SOLUTION INTRAMUSCULAR; INTRAVENOUS PRN
Status: DISCONTINUED | OUTPATIENT
Start: 2022-11-24 | End: 2022-11-24 | Stop reason: SDUPTHER

## 2022-11-24 RX ORDER — ROPIVACAINE HYDROCHLORIDE 5 MG/ML
INJECTION, SOLUTION EPIDURAL; INFILTRATION; PERINEURAL
Status: COMPLETED | OUTPATIENT
Start: 2022-11-24 | End: 2022-11-24

## 2022-11-24 RX ORDER — SODIUM CHLORIDE, SODIUM LACTATE, POTASSIUM CHLORIDE, CALCIUM CHLORIDE 600; 310; 30; 20 MG/100ML; MG/100ML; MG/100ML; MG/100ML
INJECTION, SOLUTION INTRAVENOUS CONTINUOUS PRN
Status: DISCONTINUED | OUTPATIENT
Start: 2022-11-24 | End: 2022-11-24 | Stop reason: SDUPTHER

## 2022-11-24 RX ORDER — FENTANYL CITRATE 50 UG/ML
25 INJECTION, SOLUTION INTRAMUSCULAR; INTRAVENOUS EVERY 5 MIN PRN
Status: DISCONTINUED | OUTPATIENT
Start: 2022-11-24 | End: 2022-11-24 | Stop reason: HOSPADM

## 2022-11-24 RX ORDER — MORPHINE SULFATE 2 MG/ML
2 INJECTION, SOLUTION INTRAMUSCULAR; INTRAVENOUS
Status: DISCONTINUED | OUTPATIENT
Start: 2022-11-24 | End: 2022-11-27 | Stop reason: HOSPADM

## 2022-11-24 RX ORDER — PROPOFOL 10 MG/ML
INJECTION, EMULSION INTRAVENOUS PRN
Status: DISCONTINUED | OUTPATIENT
Start: 2022-11-24 | End: 2022-11-24 | Stop reason: SDUPTHER

## 2022-11-24 RX ORDER — ROPIVACAINE HYDROCHLORIDE 5 MG/ML
INJECTION, SOLUTION EPIDURAL; INFILTRATION; PERINEURAL
Status: COMPLETED
Start: 2022-11-24 | End: 2022-11-24

## 2022-11-24 RX ORDER — FENTANYL CITRATE 50 UG/ML
INJECTION, SOLUTION INTRAMUSCULAR; INTRAVENOUS
Status: COMPLETED
Start: 2022-11-24 | End: 2022-11-24

## 2022-11-24 RX ORDER — MIDAZOLAM HYDROCHLORIDE 1 MG/ML
INJECTION INTRAMUSCULAR; INTRAVENOUS PRN
Status: DISCONTINUED | OUTPATIENT
Start: 2022-11-24 | End: 2022-11-24 | Stop reason: SDUPTHER

## 2022-11-24 RX ORDER — GLYCOPYRROLATE 0.2 MG/ML
INJECTION INTRAMUSCULAR; INTRAVENOUS PRN
Status: DISCONTINUED | OUTPATIENT
Start: 2022-11-24 | End: 2022-11-24 | Stop reason: SDUPTHER

## 2022-11-24 RX ORDER — DEXAMETHASONE SODIUM PHOSPHATE 4 MG/ML
INJECTION, SOLUTION INTRA-ARTICULAR; INTRALESIONAL; INTRAMUSCULAR; INTRAVENOUS; SOFT TISSUE PRN
Status: DISCONTINUED | OUTPATIENT
Start: 2022-11-24 | End: 2022-11-24 | Stop reason: SDUPTHER

## 2022-11-24 RX ORDER — CEFAZOLIN SODIUM 1 G/3ML
INJECTION, POWDER, FOR SOLUTION INTRAMUSCULAR; INTRAVENOUS PRN
Status: DISCONTINUED | OUTPATIENT
Start: 2022-11-24 | End: 2022-11-24 | Stop reason: SDUPTHER

## 2022-11-24 RX ORDER — ENOXAPARIN SODIUM 100 MG/ML
40 INJECTION SUBCUTANEOUS DAILY
Status: DISCONTINUED | OUTPATIENT
Start: 2022-11-24 | End: 2022-11-26

## 2022-11-24 RX ORDER — SODIUM CHLORIDE 0.9 % (FLUSH) 0.9 %
5-40 SYRINGE (ML) INJECTION PRN
Status: DISCONTINUED | OUTPATIENT
Start: 2022-11-24 | End: 2022-11-27 | Stop reason: HOSPADM

## 2022-11-24 RX ORDER — SODIUM CHLORIDE 9 MG/ML
INJECTION, SOLUTION INTRAVENOUS PRN
Status: DISCONTINUED | OUTPATIENT
Start: 2022-11-24 | End: 2022-11-24 | Stop reason: HOSPADM

## 2022-11-24 RX ORDER — METOCLOPRAMIDE HYDROCHLORIDE 5 MG/ML
10 INJECTION INTRAMUSCULAR; INTRAVENOUS ONCE
Status: COMPLETED | OUTPATIENT
Start: 2022-11-24 | End: 2022-11-24

## 2022-11-24 RX ORDER — FENTANYL CITRATE 50 UG/ML
50 INJECTION, SOLUTION INTRAMUSCULAR; INTRAVENOUS ONCE
Status: COMPLETED | OUTPATIENT
Start: 2022-11-24 | End: 2022-11-24

## 2022-11-24 RX ORDER — MIDAZOLAM HYDROCHLORIDE 2 MG/2ML
1 INJECTION, SOLUTION INTRAMUSCULAR; INTRAVENOUS ONCE
Status: COMPLETED | OUTPATIENT
Start: 2022-11-24 | End: 2022-11-24

## 2022-11-24 RX ORDER — BUPIVACAINE HYDROCHLORIDE 5 MG/ML
INJECTION, SOLUTION EPIDURAL; INTRACAUDAL PRN
Status: DISCONTINUED | OUTPATIENT
Start: 2022-11-24 | End: 2022-11-24 | Stop reason: ALTCHOICE

## 2022-11-24 RX ORDER — ONDANSETRON 4 MG/1
4 TABLET, ORALLY DISINTEGRATING ORAL EVERY 8 HOURS PRN
Status: DISCONTINUED | OUTPATIENT
Start: 2022-11-24 | End: 2022-11-27 | Stop reason: HOSPADM

## 2022-11-24 RX ORDER — ONDANSETRON 2 MG/ML
INJECTION INTRAMUSCULAR; INTRAVENOUS PRN
Status: DISCONTINUED | OUTPATIENT
Start: 2022-11-24 | End: 2022-11-24 | Stop reason: SDUPTHER

## 2022-11-24 RX ORDER — LABETALOL HYDROCHLORIDE 5 MG/ML
5 INJECTION, SOLUTION INTRAVENOUS
Status: DISCONTINUED | OUTPATIENT
Start: 2022-11-24 | End: 2022-11-24 | Stop reason: HOSPADM

## 2022-11-24 RX ORDER — METOCLOPRAMIDE HYDROCHLORIDE 5 MG/ML
INJECTION INTRAMUSCULAR; INTRAVENOUS
Status: COMPLETED
Start: 2022-11-24 | End: 2022-11-24

## 2022-11-24 RX ORDER — PROCHLORPERAZINE EDISYLATE 5 MG/ML
5 INJECTION INTRAMUSCULAR; INTRAVENOUS
Status: DISCONTINUED | OUTPATIENT
Start: 2022-11-24 | End: 2022-11-24 | Stop reason: HOSPADM

## 2022-11-24 RX ORDER — POLYETHYLENE GLYCOL 3350 17 G/17G
17 POWDER, FOR SOLUTION ORAL DAILY PRN
Status: DISCONTINUED | OUTPATIENT
Start: 2022-11-24 | End: 2022-11-24 | Stop reason: SDUPTHER

## 2022-11-24 RX ORDER — MORPHINE SULFATE 4 MG/ML
4 INJECTION, SOLUTION INTRAMUSCULAR; INTRAVENOUS
Status: DISCONTINUED | OUTPATIENT
Start: 2022-11-24 | End: 2022-11-27 | Stop reason: HOSPADM

## 2022-11-24 RX ORDER — MEPERIDINE HYDROCHLORIDE 25 MG/ML
12.5 INJECTION INTRAMUSCULAR; INTRAVENOUS; SUBCUTANEOUS
Status: DISCONTINUED | OUTPATIENT
Start: 2022-11-24 | End: 2022-11-24 | Stop reason: HOSPADM

## 2022-11-24 RX ORDER — ONDANSETRON 4 MG/1
4 TABLET, ORALLY DISINTEGRATING ORAL EVERY 8 HOURS PRN
Status: DISCONTINUED | OUTPATIENT
Start: 2022-11-24 | End: 2022-11-24 | Stop reason: SDUPTHER

## 2022-11-24 RX ORDER — SODIUM CHLORIDE 9 MG/ML
INJECTION, SOLUTION INTRAVENOUS PRN
Status: DISCONTINUED | OUTPATIENT
Start: 2022-11-24 | End: 2022-11-27 | Stop reason: HOSPADM

## 2022-11-24 RX ORDER — OXYCODONE HYDROCHLORIDE 5 MG/1
10 TABLET ORAL EVERY 4 HOURS PRN
Status: DISCONTINUED | OUTPATIENT
Start: 2022-11-24 | End: 2022-11-27 | Stop reason: HOSPADM

## 2022-11-24 RX ORDER — ONDANSETRON 2 MG/ML
4 INJECTION INTRAMUSCULAR; INTRAVENOUS EVERY 6 HOURS PRN
Status: DISCONTINUED | OUTPATIENT
Start: 2022-11-24 | End: 2022-11-27 | Stop reason: HOSPADM

## 2022-11-24 RX ORDER — SODIUM CHLORIDE 0.9 % (FLUSH) 0.9 %
5-40 SYRINGE (ML) INJECTION EVERY 12 HOURS SCHEDULED
Status: DISCONTINUED | OUTPATIENT
Start: 2022-11-24 | End: 2022-11-24 | Stop reason: HOSPADM

## 2022-11-24 RX ORDER — HYDRALAZINE HYDROCHLORIDE 20 MG/ML
5 INJECTION INTRAMUSCULAR; INTRAVENOUS
Status: DISCONTINUED | OUTPATIENT
Start: 2022-11-24 | End: 2022-11-24 | Stop reason: HOSPADM

## 2022-11-24 RX ORDER — OXYCODONE HYDROCHLORIDE 5 MG/1
5 TABLET ORAL EVERY 4 HOURS PRN
Status: DISCONTINUED | OUTPATIENT
Start: 2022-11-24 | End: 2022-11-27 | Stop reason: HOSPADM

## 2022-11-24 RX ADMIN — SODIUM CHLORIDE, PRESERVATIVE FREE 10 ML: 5 INJECTION INTRAVENOUS at 20:52

## 2022-11-24 RX ADMIN — KETAMINE HYDROCHLORIDE 30 MG: 10 INJECTION INTRAMUSCULAR; INTRAVENOUS at 09:10

## 2022-11-24 RX ADMIN — SODIUM CHLORIDE, POTASSIUM CHLORIDE, SODIUM LACTATE AND CALCIUM CHLORIDE: 600; 310; 30; 20 INJECTION, SOLUTION INTRAVENOUS at 09:25

## 2022-11-24 RX ADMIN — HYDROMORPHONE HYDROCHLORIDE 0.5 MG: 0.5 INJECTION, SOLUTION INTRAMUSCULAR; INTRAVENOUS; SUBCUTANEOUS at 00:49

## 2022-11-24 RX ADMIN — OXYCODONE 10 MG: 5 TABLET ORAL at 12:55

## 2022-11-24 RX ADMIN — Medication 100 MCG: at 08:53

## 2022-11-24 RX ADMIN — FENTANYL CITRATE 50 MCG: 50 INJECTION, SOLUTION INTRAMUSCULAR; INTRAVENOUS at 08:18

## 2022-11-24 RX ADMIN — MIDAZOLAM HYDROCHLORIDE 1 MG: 2 INJECTION, SOLUTION INTRAMUSCULAR; INTRAVENOUS at 08:18

## 2022-11-24 RX ADMIN — SODIUM CHLORIDE, PRESERVATIVE FREE 10 ML: 5 INJECTION INTRAVENOUS at 06:42

## 2022-11-24 RX ADMIN — KETAMINE HYDROCHLORIDE 20 MG: 10 INJECTION INTRAMUSCULAR; INTRAVENOUS at 08:38

## 2022-11-24 RX ADMIN — Medication 3 MG: at 09:25

## 2022-11-24 RX ADMIN — FENTANYL CITRATE 100 MCG: 50 INJECTION, SOLUTION INTRAMUSCULAR; INTRAVENOUS at 08:38

## 2022-11-24 RX ADMIN — ATORVASTATIN CALCIUM 20 MG: 20 TABLET, FILM COATED ORAL at 21:59

## 2022-11-24 RX ADMIN — MORPHINE SULFATE 4 MG: 4 INJECTION, SOLUTION INTRAMUSCULAR; INTRAVENOUS at 20:51

## 2022-11-24 RX ADMIN — MORPHINE SULFATE 4 MG: 4 INJECTION, SOLUTION INTRAMUSCULAR; INTRAVENOUS at 11:40

## 2022-11-24 RX ADMIN — MORPHINE SULFATE 4 MG: 4 INJECTION, SOLUTION INTRAMUSCULAR; INTRAVENOUS at 14:17

## 2022-11-24 RX ADMIN — MORPHINE SULFATE 4 MG: 4 INJECTION, SOLUTION INTRAMUSCULAR; INTRAVENOUS at 18:09

## 2022-11-24 RX ADMIN — FAMOTIDINE 20 MG: 10 INJECTION, SOLUTION INTRAVENOUS at 08:14

## 2022-11-24 RX ADMIN — LIDOCAINE HYDROCHLORIDE 100 MG: 20 INJECTION, SOLUTION EPIDURAL; INFILTRATION; INTRACAUDAL; PERINEURAL at 08:38

## 2022-11-24 RX ADMIN — HYDROMORPHONE HYDROCHLORIDE 0.5 MG: 0.5 INJECTION, SOLUTION INTRAMUSCULAR; INTRAVENOUS; SUBCUTANEOUS at 03:57

## 2022-11-24 RX ADMIN — SODIUM CHLORIDE, POTASSIUM CHLORIDE, SODIUM LACTATE AND CALCIUM CHLORIDE: 600; 310; 30; 20 INJECTION, SOLUTION INTRAVENOUS at 07:50

## 2022-11-24 RX ADMIN — ROPIVACAINE HYDROCHLORIDE 10 ML: 5 INJECTION, SOLUTION EPIDURAL; INFILTRATION; PERINEURAL at 08:17

## 2022-11-24 RX ADMIN — METOCLOPRAMIDE 10 MG: 5 INJECTION, SOLUTION INTRAMUSCULAR; INTRAVENOUS at 08:16

## 2022-11-24 RX ADMIN — HYDROMORPHONE HYDROCHLORIDE 1 MG: 1 INJECTION, SOLUTION INTRAMUSCULAR; INTRAVENOUS; SUBCUTANEOUS at 07:48

## 2022-11-24 RX ADMIN — HYDROMORPHONE HYDROCHLORIDE 0.5 MG: 0.5 INJECTION, SOLUTION INTRAMUSCULAR; INTRAVENOUS; SUBCUTANEOUS at 06:42

## 2022-11-24 RX ADMIN — OXYCODONE 10 MG: 5 TABLET ORAL at 22:05

## 2022-11-24 RX ADMIN — ONDANSETRON 4 MG: 2 INJECTION INTRAMUSCULAR; INTRAVENOUS at 09:25

## 2022-11-24 RX ADMIN — FENTANYL CITRATE 50 MCG: 50 INJECTION INTRAMUSCULAR; INTRAVENOUS at 08:18

## 2022-11-24 RX ADMIN — OXYCODONE 10 MG: 5 TABLET ORAL at 17:00

## 2022-11-24 RX ADMIN — TRANEXAMIC ACID 1000 MG: 1 INJECTION, SOLUTION INTRAVENOUS at 11:00

## 2022-11-24 RX ADMIN — ROCURONIUM BROMIDE 40 MG: 10 INJECTION, SOLUTION INTRAVENOUS at 08:38

## 2022-11-24 RX ADMIN — SODIUM CHLORIDE, PRESERVATIVE FREE 10 ML: 5 INJECTION INTRAVENOUS at 03:56

## 2022-11-24 RX ADMIN — CEFAZOLIN 2 G: 1 INJECTION, POWDER, FOR SOLUTION INTRAMUSCULAR; INTRAVENOUS at 08:45

## 2022-11-24 RX ADMIN — SODIUM CHLORIDE, PRESERVATIVE FREE 10 ML: 5 INJECTION INTRAVENOUS at 00:49

## 2022-11-24 RX ADMIN — WATER 2000 MG: 1 INJECTION INTRAMUSCULAR; INTRAVENOUS; SUBCUTANEOUS at 17:00

## 2022-11-24 RX ADMIN — METOCLOPRAMIDE HYDROCHLORIDE 10 MG: 5 INJECTION INTRAMUSCULAR; INTRAVENOUS at 08:16

## 2022-11-24 RX ADMIN — PROPOFOL 80 MG: 10 INJECTION, EMULSION INTRAVENOUS at 08:38

## 2022-11-24 RX ADMIN — ACETAMINOPHEN 650 MG: 325 TABLET ORAL at 12:56

## 2022-11-24 RX ADMIN — GLYCOPYRROLATE 0.6 MG: 0.2 INJECTION INTRAMUSCULAR; INTRAVENOUS at 09:25

## 2022-11-24 RX ADMIN — DEXAMETHASONE SODIUM PHOSPHATE 10 MG: 4 INJECTION, SOLUTION INTRAMUSCULAR; INTRAVENOUS at 08:38

## 2022-11-24 RX ADMIN — TRANEXAMIC ACID 1000 MG: 1 INJECTION, SOLUTION INTRAVENOUS at 09:00

## 2022-11-24 RX ADMIN — MIDAZOLAM 1 MG: 1 INJECTION INTRAMUSCULAR; INTRAVENOUS at 08:18

## 2022-11-24 RX ADMIN — ENOXAPARIN SODIUM 40 MG: 100 INJECTION SUBCUTANEOUS at 20:52

## 2022-11-24 RX ADMIN — MIDAZOLAM 1 MG: 1 INJECTION INTRAMUSCULAR; INTRAVENOUS at 09:10

## 2022-11-24 ASSESSMENT — PAIN - FUNCTIONAL ASSESSMENT
PAIN_FUNCTIONAL_ASSESSMENT: PREVENTS OR INTERFERES SOME ACTIVE ACTIVITIES AND ADLS
PAIN_FUNCTIONAL_ASSESSMENT: ACTIVITIES ARE NOT PREVENTED
PAIN_FUNCTIONAL_ASSESSMENT: ACTIVITIES ARE NOT PREVENTED

## 2022-11-24 ASSESSMENT — PAIN SCALES - GENERAL
PAINLEVEL_OUTOF10: 9
PAINLEVEL_OUTOF10: 8
PAINLEVEL_OUTOF10: 10
PAINLEVEL_OUTOF10: 7
PAINLEVEL_OUTOF10: 10
PAINLEVEL_OUTOF10: 9
PAINLEVEL_OUTOF10: 0
PAINLEVEL_OUTOF10: 9
PAINLEVEL_OUTOF10: 9
PAINLEVEL_OUTOF10: 0
PAINLEVEL_OUTOF10: 7
PAINLEVEL_OUTOF10: 10
PAINLEVEL_OUTOF10: 6

## 2022-11-24 ASSESSMENT — ENCOUNTER SYMPTOMS
CHEST TIGHTNESS: 0
DIARRHEA: 0
CONSTIPATION: 0
VOMITING: 0
BLOOD IN STOOL: 0
NAUSEA: 1
SHORTNESS OF BREATH: 0

## 2022-11-24 ASSESSMENT — PAIN DESCRIPTION - ORIENTATION
ORIENTATION: RIGHT

## 2022-11-24 ASSESSMENT — PAIN DESCRIPTION - LOCATION
LOCATION: HIP

## 2022-11-24 ASSESSMENT — PAIN DESCRIPTION - PAIN TYPE
TYPE: SURGICAL PAIN
TYPE: SURGICAL PAIN

## 2022-11-24 ASSESSMENT — PAIN DESCRIPTION - DESCRIPTORS
DESCRIPTORS: ACHING
DESCRIPTORS: ACHING;DISCOMFORT
DESCRIPTORS: ACHING
DESCRIPTORS: ACHING;DISCOMFORT
DESCRIPTORS: ACHING

## 2022-11-24 ASSESSMENT — LIFESTYLE VARIABLES: SMOKING_STATUS: 1

## 2022-11-24 ASSESSMENT — PAIN DESCRIPTION - ONSET: ONSET: ON-GOING

## 2022-11-24 ASSESSMENT — PAIN DESCRIPTION - FREQUENCY: FREQUENCY: CONTINUOUS

## 2022-11-24 NOTE — PROGRESS NOTES
Patient had large urine incontinence on pad. Dr. Marisa Mesa made aware and pain medication ordered. Pt was medicated in preparation for turning and skin care. Informed patient of process and that she needs to log roll in order to keep extremity in place. Pt was turned very delicately with this nurse and 2 externs , however Pt held to side rails while turning and advised to let go to prevent further dislocation of affected area. Pt turned and skin was cleansed with bath wipes and CHG wipes. Noted no skin breakdown. Pt returned to back and pads replaced.

## 2022-11-24 NOTE — CONSULTS
Orthopaedic Consultation  Timmy Soliman DO      CHIEF COMPLAINT: Right hip pain    History of Present Illness: This patient is a 57-year-old female who is seen today in evaluation after sustaining mechanical fall from standing height. She is quite drowsy during my encounter today, and most of the HPI was provided by her boyfriend as well as her granddaughter who are present at the bedside. They state that yesterday, she sustained a fall at the house of her boyfriend. She tripped over a board and fell hard onto the ground, landing directly onto her right hip. She apparently had no previous history of pain or injury to the right hip before this injury. She is unable to stand or bear weight on the right lower extremity due to pain. She was subsequently brought to the ER and x-rays were obtained. She was subsequently admitted and orthopedics was consulted. Currently, the patient has no additional areas of pain besides the right hip. She is a community ambulator not typically requiring the use of any DME including a wheeled walker or cane. Past Medical History:   Diagnosis Date    Hyperlipidemia          Past Surgical History:   Procedure Laterality Date    NECK MASS EXCISION Left     Around 1972       Medications Prior to Admission:    Medications Prior to Admission: atorvastatin (LIPITOR) 20 MG tablet, Take 20 mg by mouth daily  Cholecalciferol (VITAMIN D) 50 MCG (2000 UT) CAPS capsule, Take by mouth    Allergies:    Patient has no known allergies. Social History:    reports that she has been smoking cigarettes. She has a 25.00 pack-year smoking history. She does not have any smokeless tobacco history on file. She reports current alcohol use. She reports that she does not use drugs. Family History:   family history is not on file.     REVIEW OF SYSTEMS:  As above in the HPI, otherwise negative    PHYSICAL EXAM:    Vitals:  /65   Pulse 64   Temp 98.6 °F (37 °C) (Oral)   Resp 16   Wt 144 lb (65.3 kg)   SpO2 96%     General:  Awake, alert, oriented X 3. Well developed, well nourished, well groomed. No apparent distress. HEENT:  Normocephalic, atraumatic. Pupils equal, round, reactive to light. No scleral icterus. No conjunctival injection. Normal lips, teeth, and gums. No nasal discharge. Neck:  Supple  Heart:  RRR, no murmurs, gallops, or rubs  Lungs:  CTA bilaterally, bilat symmetrical expansion, no wheeze, rales, or rhonchi  Abdomen: Bowel sounds present, soft, nontender, no masses, no organomegaly, no peritoneal signs  Extremities: Right lower extremity slightly shortened and externally rotated compared to left. Patient is able to gently dorsiflex and plantarflex the right ankle and wiggle all toes. All compartments about the right lower extremity are soft and easily compressible. Sensation grossly intact light touch, L3-S1. Range of motion of the right hip not assessed due to known underlying fracture  Skin:  Warm and dry, no open lesions or rash  Neuro:  Cranial nerves 2-12 intact, no focal deficits  Breast: deferred  Rectal: deferred  Genitalia:  deferred    LABS:  CBC:   Lab Results   Component Value Date/Time    WBC 10.7 11/24/2022 04:15 AM    RBC 3.81 11/24/2022 04:15 AM    HGB 12.4 11/24/2022 04:15 AM    HCT 37.4 11/24/2022 04:15 AM    MCV 98.2 11/24/2022 04:15 AM    MCH 32.5 11/24/2022 04:15 AM    MCHC 33.2 11/24/2022 04:15 AM    RDW 13.1 11/24/2022 04:15 AM     11/24/2022 04:15 AM    MPV 9.7 11/24/2022 04:15 AM         ASSESSMENT:      Patient Active Problem List   Diagnosis    Intertrochanteric fracture of right femur, closed, initial encounter ([address]    Leukocytosis    Acidosis    Hyperlipidemia    Closed displaced intertrochanteric fracture of right femur ([address]       PLAN:    X-rays were reviewed. Patient has a simple appearing intertrochanteric fracture of the right hip with minimal displacement.   That being said, as I had discussed with family, this fracture is unlikely to heal without surgical intervention. This patient is apparently very active and after discussion with family, they would like to proceed with treatment. I have recommended open reduction internal fixation of the right hip using a cephalomedullary nail. The surgery including all risk, benefits alternatives to treatment clued but not limited to loss of life, loss of limb, bleeding, infection, damage to surrounding structures and potential need for further surgery were reviewed. Informed sent was obtained and verified. I explained the postoperative recovery and rehab course. She will be placed on Lovenox postoperatively to prevent DVT. We will order PT and OT to help with her recovery and activity. Of note greater than 50 minutes were spent on the floor and with patient performing H&P, reviewing labs and imaging, and discussing plan. Half the time was spent counseling and coordinating care.       Geraldo Washburn  [facility]

## 2022-11-24 NOTE — ANESTHESIA PRE PROCEDURE
Department of Anesthesiology  Preprocedure Note       Name:  Oliver Fish   Age:  67 y.o.  :  1950                                          MRN:  [id number]         Date:  2022      Surgeon: Stanton Patrick):  Jaime Fuchs DO    Procedure: Procedure(s):  HIP OPEN REDUCTION INTERNAL FIXATION    Medications prior to admission:   Prior to Admission medications    Medication Sig Start Date End Date Taking?  Authorizing Provider   atorvastatin (LIPITOR) 20 MG tablet Take 20 mg by mouth daily   Yes Historical Provider, MD   Cholecalciferol (VITAMIN D) 50 MCG (2000) CAPS capsule Take by mouth   Yes Historical Provider, MD       Current medications:    Current Facility-Administered Medications   Medication Dose Route Frequency Provider Last Rate Last Admin    sodium chloride flush 0.9 % injection 5-40 mL  5-40 mL IntraVENous 2 times per day Moe Sanchez MD        sodium chloride flush 0.9 % injection 5-40 mL  5-40 mL IntraVENous PRN Moe Sanchez MD        0.9 % sodium chloride infusion   IntraVENous PRN Moe Sanchez MD        ondansetron (ZOFRAN-ODT) disintegrating tablet 4 mg  4 mg Oral Q8H PRN Moe Sanchez MD        Or    ondansetron (ZOFRAN) injection 4 mg  4 mg IntraVENous Q6H PRN Moe Sanchez MD        polyethylene glycol (GLYCOLAX) packet 17 g  17 g Oral Daily PRN Moe Sanchez MD        acetaminophen (TYLENOL) tablet 650 mg  650 mg Oral Q6H PRN Moe Sanchez MD        Or    acetaminophen (TYLENOL) suppository 650 mg  650 mg Rectal Q6H PRN Moe Sanchez MD        HYDROmorphone (DILAUDID) injection 0.5 mg  0.5 mg IntraVENous Q3H PRN Moe Sanchez MD   0.5 mg at 22 6096    sodium chloride flush 0.9 % injection 5-40 mL  5-40 mL IntraVENous 2 times per day Moe Sanchez MD        sodium chloride flush 0.9 % injection 5-40 mL  5-40 mL IntraVENous PRN Moe Sanchez MD   10 mL at 22 0356    0.9 % sodium chloride infusion   IntraVENous PRN Moe Sanchez MD       [facility] thiamine tablet 100 mg  100 mg Oral Daily Olivia Moise MD        sodium chloride flush 0.9 % injection 5-40 mL  5-40 mL IntraVENous 2 times per day Ubaldo Coppersmith Berdis, DO   10 mL at 11/23/22 2059    sodium chloride flush 0.9 % injection 5-40 mL  5-40 mL IntraVENous PRN Ubaldo Coppersmith Berdis, DO   10 mL at 11/24/22 8168    0.9 % sodium chloride infusion   IntraVENous PRN [place] Coppersmith Berdis, DO           Allergies:  No Known Allergies    Problem List:    Patient Active Problem List   Diagnosis Code    Intertrochanteric fracture of right femur, closed, initial encounter ([facility] S72.141A    Leukocytosis D72.829    Acidosis E87.20    Hyperlipidemia E78.5    Closed displaced intertrochanteric fracture of right femur ([facility] S72.141A       Past Medical History:        Diagnosis Date    Hyperlipidemia        Past Surgical History:        Procedure Laterality Date    NECK MASS EXCISION Left     Around 0       Social History:    Social History     Tobacco Use    Smoking status: Every Day     Packs/day: 0.50     Years: 50.00     Pack years: 25.00     Types: Cigarettes    Smokeless tobacco: Not on file   Substance Use Topics    Alcohol use:  Yes                                Ready to quit: Not Answered  Counseling given: Not Answered      Vital Signs (Current):   Vitals:    11/23/22 1326 11/23/22 1400 11/23/22 2045 11/24/22 0053   BP: (!) 148/77 (!) 142/67 128/74 109/60   Pulse: 73 75 63 71   Resp: 17 16 16    Temp:  97.3 °F (36.3 °C) 98.2 °F (36.8 °C)    TempSrc:  Oral Oral    SpO2: 98% 97% 97%    Weight:                                                  BP Readings from Last 3 Encounters:   11/24/22 109/60       NPO Status: Time of last liquid consumption: 0000                        Time of last solid consumption: 0000                        Date of last liquid consumption: 11/24/22                        Date of last solid food consumption: 11/24/22    BMI:   Wt Readings from Last 3 Encounters:   11/23/22 144 lb (65.3 kg)     There is no height or weight on file to calculate BMI.    CBC:   Lab Results   Component Value Date/Time    WBC 10.7 11/24/2022 04:15 AM    RBC 3.81 11/24/2022 04:15 AM    HGB 12.4 11/24/2022 04:15 AM    HCT 37.4 11/24/2022 04:15 AM    MCV 98.2 11/24/2022 04:15 AM    RDW 13.1 11/24/2022 04:15 AM     11/24/2022 04:15 AM       CMP:   Lab Results   Component Value Date/Time     11/24/2022 04:15 AM    K 4.1 11/24/2022 04:15 AM     11/24/2022 04:15 AM    CO2 19 11/24/2022 04:15 AM    BUN 14 11/24/2022 04:15 AM    CREATININE 0.7 11/24/2022 04:15 AM    LABGLOM >60 11/24/2022 04:15 AM    GLUCOSE 122 11/24/2022 04:15 AM    PROT 7.0 11/23/2022 12:09 PM    CALCIUM 9.1 11/24/2022 04:15 AM    BILITOT 0.3 11/23/2022 12:09 PM    ALKPHOS 54 11/23/2022 12:09 PM    AST 26 11/23/2022 12:09 PM    ALT 17 11/23/2022 12:09 PM       POC Tests: No results for input(s): POCGLU, POCNA, POCK, POCCL, POCBUN, POCHEMO, POCHCT in the last 72 hours.     Coags:   Lab Results   Component Value Date/Time    PROTIME 11.1 11/23/2022 12:09 PM    INR 1.0 11/23/2022 12:09 PM       HCG (If Applicable): No results found for: PREGTESTUR, PREGSERUM, HCG, HCGQUANT     ABGs: No results found for: PHART, PO2ART, QYK6SZV, ZCV1QLE, BEART, G3CESPIY     Type & Screen (If Applicable):  No results found for: LABABO, LABRH    Drug/Infectious Status (If Applicable):  No results found for: HIV, HEPCAB    COVID-19 Screening (If Applicable): No results found for: COVID19      Narrative   EXAMINATION:   ONE XRAY VIEW OF THE PELVIS AND TWO XRAY VIEWS RIGHT HIP       11/23/2022 11:53 am       COMPARISON:   None.       HISTORY:   ORDERING SYSTEM PROVIDED HISTORY: fall, shortening rotation   TECHNOLOGIST PROVIDED HISTORY:   Reason for exam:->fall, shortening rotation       FINDINGS:   There is greater trochanteric fracture seen right hip with mild varus   angulation.  Lesser tuberosity is intact.               Anesthesia Evaluation  Patient summary reviewed and Nursing notes reviewed no history of anesthetic complications:   Airway: Mallampati: III  TM distance: >3 FB   Neck ROM: full  Mouth opening: > = 3 FB   Dental:          Pulmonary:   (+) decreased breath sounds: bilateral current smoker    (-) sleep apnea          Patient did not smoke on day of surgery. PE comment: Coarse Cardiovascular:Negative CV ROS  Exercise tolerance: good (>4 METS),   (+) hyperlipidemia    (-) past MI, CAD, CABG/stent, dysrhythmias and  angina    ECG reviewed  Rhythm: regular  Rate: normal           Beta Blocker:  Not on Beta Blocker      ROS comment: EKG:  Normal sinus rhythm  Normal ECG  No previous ECGs available     Neuro/Psych:   Negative Neuro/Psych ROS     (-) seizures, TIA and CVA           GI/Hepatic/Renal: Neg GI/Hepatic/Renal ROS       (-) hepatitis       Endo/Other: Negative Endo/Other ROS   (+) : arthritis: OA., .    (-) diabetes mellitus, blood dyscrasia        Pt had no PAT visit       Abdominal:         (-) obese       Vascular: negative vascular ROS. - DVT and PE. Other Findings: Hoarseness  Shortened and externally rotated RLE          Anesthesia Plan      general and regional     ASA 2     (Modified RSI with HOB at 30 degrees RT  Pre-oxygenation x 3 minutes  20mg Ketamine  PONV prophylaxis)  Induction: intravenous. MIPS: Postoperative opioids intended and Prophylactic antiemetics administered. Anesthetic plan and risks discussed with patient. Plan discussed with CRNA. Post-op pain plan if not by surgeon: single peripheral nerve block            Stephen Salinas DO   11/24/2022    PNB Consent:    Benefits, alternatives and risks of PNBs were discussed with patient. Risks include but are not limited to bleeding, LAST, infection and possible nerve trauma. PNB was recommended and encouraged as part of multi-modal pain therapy. All questions were answered.   After consideration of the above, the patient agrees to:  Right lateral femoral cutaneous nerve block for adjunctive post operative pain management.     Jovana Caban DO  Staff Anesthesiologist  November 24, 2022  8:28 AM

## 2022-11-24 NOTE — OP NOTE
Operative Note      Patient: Luisa García  YOB: 1950  MRN: [id number]    Date of Procedure: 11/24/2022    Pre-Op Diagnosis: Closed fracture of right hip, initial encounter ([facility] [S72.001A]    Post-Op Diagnosis: Same       Procedure(s):  HIP OPEN REDUCTION INTERNAL FIXATION    Surgeon(s):  Britton Craven DO    Assistant:   * No surgical staff found *    Anesthesia: General    Estimated Blood Loss (mL): less than 125     Complications: None    Specimens:   * No specimens in log *    Implants:  Implant Name Type Inv. Item Serial No.  Lot No. LRB No. Used Action   SCREW BONE L48MM DIA3. 5MM TI ALFRED LO PROF - [id number]  SCREW BONE L48MM DIA3. 5MM TI ALFRED LO PROF  [employer]  Right 3 Implanted   NAIL IM L170MM [id number] 125DEG SHT PROX FEM GRN TI-15MO - [id number]  NAIL IM L170MM [id number] NK 125DEG SHT PROX FEM GRN TI-15MO  [employer] [id number] Right 1 Implanted   SCREW BNE L95MM DIA10.35MM PROX FEM G TI JESUS FOR TFN ADV - [id number]  SCREW BNE L95MM DIA10.35MM PROX FEM G TI JESUS FOR TFN ADV  [employer] [id number] Right 1 Implanted   SCREW BNE L34MM DIA5MM TIB LT GRN TI ST JESUS USMAN FULL THRD - [id number]  SCREW BNE L34MM DIA5MM TIB LT GRN TI ST JESUS USMAN FULL THRD  DEPU[employer] [id number] Right 1 Implanted         Drains: * No LDAs found *    Findings: As expected, see below    Detailed Description of Procedure:   Patient was greeted in the preoperative holding area. All questions were answered. I used an indelible skin marker to nawaf the operative extremity and confirmed the operation to be performed. H she e was then taken to the operative suite and general anesthesia was been administered per the anesthesia team.  Patient was transferred over to the fracture table. All bony prominences were well-padded. A timeout was performed which the patient, operative extremity as well as the procedure were confirmed by all parties present.   Longitudinal traction was placed to the operative leg and this was fine-tuned using the fracture table. The leg was adducted and slightly internally rotated. The right lower extremity was then prepped and draped in a sterile orthopedic fashion. A #10 scalpel blade was used to create a longitudinal incision several centimeters proximal to the greater trochanter. The incision was carried through subcutaneous tissue as well as the overlying iliotibial fascia. A threaded tip guidewire was then placed just medial to the most proximal tip of the greater trochanter. Positioning was confirmed on both the AP and lateral views using fluoroscopy. The threaded tip guidewire was impacted into position within the canal of the proximal femur. An opening reamer with soft tissue protector was passed over the guidewire and this was used to ream down to the level of the lesser trochanter. The threaded tip guidewire and reamer removed. Preoperatively, we had determined that an 11 mm diameter nail would be sufficient to fill the femoral canal.    An 11 mm diameter nail was assembled on the back table and attached to the outrigger construct. Once this was secure, the nail was passed into appropriate position. The targeting guide was then inserted into the outrigger to allow for insertion of our compression screw. A 10 blade was used to incise through the skin and iliotibial fascia at this level. The guide was then passed to the level of the lateral bony cortex. A threaded tip guidewire was inserted through the guide and into the central aspect of the femoral neck, making sure to stop several millimeter shy of subchondral bone. Positioning was confirmed on both the AP and lateral x-rays. A depth gauge was inserted over the guidewire to ensure the appropriate size of the inserted compression screw as well as the necessary depth for the drill.   The drill was passed over the guidewire, and then the appropriate compression screw was inserted over the guidewire. Once the screw was in its final position, we used the ball-tipped screwdriver to lock the set screw proximally through the outrigger. The set screw was backed off one quarter turn to allow compression through the nail and at the fracture site. We then used the compression guide through the outrigger to achieve compression across the compression screw. This was done carefully, using fluoroscopy as a guide. Once we achieved maximal compression, the setscrew was locked at the proximal aspect of the nail. Reduction was confirmed on both the AP and lateral x-rays. We then turned our attention to the distal locking screw within the nail. A trocar was inserted into the static distal locking hole in the outrigger. We drilled bicortically and then inserted a depth gauge to determine the appropriate length of the locking screw. The locking screw was then secured and tightened appropriately. We used a screwdriver to unlock the nail from the outrigger construct. The screwdriver and all instrumentation was then removed. Final imaging was then obtained. All wounds were copiously irrigated using normal saline. 0 Vicryl suture was used to repair the iliotibial fascia defects in the proximalmost incision. Subcutaneous tissues were closed using 2-0 Vicryl suture. Skin margins were approximated using staples. A sterile dressing was placed, anesthesia was reversed and patient was transferred back to her hospital bed.     Electronically signed by Rosa Orozco DO on 11/24/2022 at 9:51 AM

## 2022-11-24 NOTE — PROGRESS NOTES
[facility]  Progress Note        Chief complaint :  Chief Complaint   Patient presents with    Loss of Consciousness     Pt became dizzy and had a syncopal episode. Denies hitting head. Now C/O right hip pain. Subjective:  Michael Jackson was seen and examined resting in bed with granddaughter present in room prior to operation. Patient stated that she continues to have right hip pain that is tolerable as long as she does not move. She was able to get some sleep overnight. She has not identified any new symptoms. She denies any tremulousness or seizure activity. Review of Systems   Constitutional:  Negative for chills and fever. Respiratory:  Negative for chest tightness and shortness of breath. Cardiovascular:  Negative for chest pain, palpitations and leg swelling. Gastrointestinal:  Positive for nausea. Negative for blood in stool, constipation, diarrhea and vomiting. Genitourinary:  Negative for difficulty urinating and dysuria. Musculoskeletal:  Positive for arthralgias and myalgias. Neurological:  Negative for dizziness, seizures, syncope and light-headedness. Psychiatric/Behavioral:  Positive for agitation. Objective:  BP (!) 151/64   Pulse 71   Temp 98 °F (36.7 °C) (Oral)   Resp 16   Wt 144 lb (65.3 kg)   SpO2 99%     Physical Exam  Vitals reviewed. Constitutional:       General: She is not in acute distress. Appearance: Normal appearance. HENT:      Head: Normocephalic. Right Ear: External ear normal.      Left Ear: External ear normal.      Nose:      Comments: Small abrasion over tip of the nose     Mouth/Throat:      Mouth: Mucous membranes are moist.   Eyes:      General: No scleral icterus. Extraocular Movements: Extraocular movements intact. Conjunctiva/sclera: Conjunctivae normal.   Cardiovascular:      Rate and Rhythm: Normal rate and regular rhythm. Heart sounds: Normal heart sounds.  No murmur heard.    No friction rub. No gallop. Pulmonary:      Effort: Pulmonary effort is normal.      Breath sounds: Normal breath sounds. No wheezing or rales. Abdominal:      General: Abdomen is flat. Palpations: Abdomen is soft. There is no mass. Tenderness: There is no abdominal tenderness. There is no guarding. Musculoskeletal:         General: Signs of injury present. Normal range of motion. Cervical back: Normal range of motion and neck supple. Right lower leg: No edema. Left lower leg: No edema. Comments: decreased range of motion right hip, pain on movement secondary to confirmed fracture on imaging   Skin:     General: Skin is warm and dry. Neurological:      General: No focal deficit present. Mental Status: She is alert.    Psychiatric:         Mood and Affect: Mood normal.       Labs:  Recent Results (from the past 24 hour(s))   TYPE AND SCREEN    Collection Time: 11/23/22  1:44 PM   Result Value Ref Range    ABO/Rh CANCELED     Antibody Screen NEG    ABORH TUBE    Collection Time: 11/23/22  1:44 PM   Result Value Ref Range    ABO/Rh B POS    Basic Metabolic Panel w/ Reflex to MG    Collection Time: 11/24/22  4:15 AM   Result Value Ref Range    Sodium 137 132 - 146 mmol/L    Potassium reflex Magnesium 4.1 3.5 - 5.0 mmol/L    Chloride 107 98 - 107 mmol/L    CO2 19 (L) 22 - 29 mmol/L    Anion Gap 11 7 - 16 mmol/L    Glucose 122 (H) 74 - 99 mg/dL    BUN 14 6 - 23 mg/dL    Creatinine 0.7 0.5 - 1.0 mg/dL    Est, Glom Filt Rate >60 >=60 mL/min/1.73    Calcium 9.1 8.6 - 10.2 mg/dL   CBC with Auto Differential    Collection Time: 11/24/22  4:15 AM   Result Value Ref Range    WBC 10.7 4.5 - 11.5 E9/L    RBC 3.81 3.50 - 5.50 E12/L    Hemoglobin 12.4 11.5 - 15.5 g/dL    Hematocrit 37.4 34.0 - 48.0 %    MCV 98.2 80.0 - 99.9 fL    MCH 32.5 26.0 - 35.0 pg    MCHC 33.2 32.0 - 34.5 %    RDW 13.1 11.5 - 15.0 fL    Platelets 721 824 - 688 E9/L    MPV 9.7 7.0 - 12.0 fL    Neutrophils % 70.9 43.0 - 80.0 %    Immature Granulocytes % 0.4 0.0 - 5.0 %    Lymphocytes % 22.7 20.0 - 42.0 %    Monocytes % 5.5 2.0 - 12.0 %    Eosinophils % 0.2 0.0 - 6.0 %    Basophils % 0.3 0.0 - 2.0 %    Neutrophils Absolute 7.59 (H) 1.80 - 7.30 E9/L    Immature Granulocytes # 0.04 E9/L    Lymphocytes Absolute 2.43 1.50 - 4.00 E9/L    Monocytes Absolute 0.59 0.10 - 0.95 E9/L    Eosinophils Absolute 0.02 (L) 0.05 - 0.50 E9/L    Basophils Absolute 0.03 0.00 - 0.20 E9/L       Radiology and other tests reviewed:  FLUORO FOR SURGICAL PROCEDURES   Final Result   Intraprocedural fluoroscopic spot images as above. See separate procedure   report for more information. XR CHEST PORTABLE   Final Result   No acute process. XR HIP 2-3 VW W PELVIS RIGHT   Final Result   Mildly displaced intertrochanteric fracture right hip with mild varus   angulation.              Assessment:  Active Hospital Problems    Diagnosis Date Noted    Intertrochanteric fracture of right femur, closed, initial encounter ([facility] [address] Saeed Ling 11/23/2022     Priority: Medium    Leukocytosis [D72.829] 11/23/2022     Priority: Medium    Acidosis [E87.20] 11/23/2022     Priority: Medium    Hyperlipidemia [E78.5] 11/23/2022     Priority: Medium    Closed displaced intertrochanteric fracture of right femur [facility]) [S72.141A] 11/23/2022     Priority: Medium       Plan:    Right hip fracture  ORIF today per orthopedic surgery, additional pain regimen or changes per orthopedics, will defer initiation of DVT prophylaxis to orthopedic surgery  Orthopedic surgery following, appreciate input    Alcohol use disorder  Patient denies any symptoms of withdrawal, seizures, syncope since admission  CIWA protocol in place, continue to monitor patient's clinical status, avoid seizures    Leukocytosis, resolved  Leukocytosis resolved on a.m. labs without antibiotic intervention, leukocytosis likely was unrelated to infection, no further intervention at this time    Pain Control: Dilaudid 0.5 mg every 3 hours as needed  DVT ppx: None, defer to orthopedic surgery  GI ppx: None  Code Status: Full  Diet: NPO      Disposition: Discharge to pending clinical course    Leslie Ramirez MD   Family Medicine Resident PGY-1  11/24/22   1:19 PM   HOSPITALIST ATTENDING PHYSICIAN NOTE 11/24/2022 1557PM:    Details of the evaluation - subjective assessment (including medication profile, past medical, family and social history when applicable), examination, review of lab and test data, diagnostic impressions and medical decision making - performed by Leslie Ramirez MD, were discussed with me on the date of service and I agree with clinical information herein unless otherwise noted. The patient has been evaluated by me personally earlier today. Pt reports no fevers, chills,n/v    Exam: heart reg at rate of 72, lungs cta, abd pos bs soft nt, ext neg for le edema    I agree with the assessment and plan of Leslie Ramirez MD    Right mildly displaced intertrochanteric fx of right hip  Leukocytosis  acidosis  Alcohol abuse  Hyperlipidemia  Elevated bp without dx of htn      Electronically signed by Saad Moya D.O.   Hospitalist  4[facility] Hospitalist Service at [facility]

## 2022-11-25 LAB
ANION GAP SERPL CALCULATED.3IONS-SCNC: 10 MMOL/L (ref 7–16)
BASOPHILS ABSOLUTE: 0.02 E9/L (ref 0–0.2)
BASOPHILS RELATIVE PERCENT: 0.2 % (ref 0–2)
BUN BLDV-MCNC: 11 MG/DL (ref 6–23)
CALCIUM SERPL-MCNC: 9.2 MG/DL (ref 8.6–10.2)
CHLORIDE BLD-SCNC: 108 MMOL/L (ref 98–107)
CO2: 21 MMOL/L (ref 22–29)
CREAT SERPL-MCNC: 0.8 MG/DL (ref 0.5–1)
EOSINOPHILS ABSOLUTE: 0 E9/L (ref 0.05–0.5)
EOSINOPHILS RELATIVE PERCENT: 0 % (ref 0–6)
GFR SERPL CREATININE-BSD FRML MDRD: >60 ML/MIN/1.73
GLUCOSE BLD-MCNC: 93 MG/DL (ref 74–99)
HCT VFR BLD CALC: 33.1 % (ref 34–48)
HEMOGLOBIN: 11.3 G/DL (ref 11.5–15.5)
IMMATURE GRANULOCYTES #: 0.05 E9/L
IMMATURE GRANULOCYTES %: 0.4 % (ref 0–5)
LYMPHOCYTES ABSOLUTE: 2.16 E9/L (ref 1.5–4)
LYMPHOCYTES RELATIVE PERCENT: 17.3 % (ref 20–42)
MCH RBC QN AUTO: 33 PG (ref 26–35)
MCHC RBC AUTO-ENTMCNC: 34.1 % (ref 32–34.5)
MCV RBC AUTO: 96.8 FL (ref 80–99.9)
MONOCYTES ABSOLUTE: 1.11 E9/L (ref 0.1–0.95)
MONOCYTES RELATIVE PERCENT: 8.9 % (ref 2–12)
MRSA CULTURE ONLY: NORMAL
NEUTROPHILS ABSOLUTE: 9.13 E9/L (ref 1.8–7.3)
NEUTROPHILS RELATIVE PERCENT: 73.2 % (ref 43–80)
PDW BLD-RTO: 13.1 FL (ref 11.5–15)
PLATELET # BLD: 207 E9/L (ref 130–450)
PMV BLD AUTO: 9.8 FL (ref 7–12)
POTASSIUM REFLEX MAGNESIUM: 4 MMOL/L (ref 3.5–5)
RBC # BLD: 3.42 E12/L (ref 3.5–5.5)
SODIUM BLD-SCNC: 139 MMOL/L (ref 132–146)
WBC # BLD: 12.5 E9/L (ref 4.5–11.5)

## 2022-11-25 PROCEDURE — 97161 PT EVAL LOW COMPLEX 20 MIN: CPT

## 2022-11-25 PROCEDURE — 36415 COLL VENOUS BLD VENIPUNCTURE: CPT

## 2022-11-25 PROCEDURE — 97530 THERAPEUTIC ACTIVITIES: CPT

## 2022-11-25 PROCEDURE — 6370000000 HC RX 637 (ALT 250 FOR IP): Performed by: NURSE PRACTITIONER

## 2022-11-25 PROCEDURE — 6370000000 HC RX 637 (ALT 250 FOR IP)

## 2022-11-25 PROCEDURE — 2700000000 HC OXYGEN THERAPY PER DAY

## 2022-11-25 PROCEDURE — 80048 BASIC METABOLIC PNL TOTAL CA: CPT

## 2022-11-25 PROCEDURE — 97165 OT EVAL LOW COMPLEX 30 MIN: CPT

## 2022-11-25 PROCEDURE — 99232 SBSQ HOSP IP/OBS MODERATE 35: CPT | Performed by: INTERNAL MEDICINE

## 2022-11-25 PROCEDURE — 2580000003 HC RX 258: Performed by: GENERAL ACUTE CARE HOSPITAL

## 2022-11-25 PROCEDURE — 6370000000 HC RX 637 (ALT 250 FOR IP): Performed by: GENERAL ACUTE CARE HOSPITAL

## 2022-11-25 PROCEDURE — 85025 COMPLETE CBC W/AUTO DIFF WBC: CPT

## 2022-11-25 PROCEDURE — 6360000002 HC RX W HCPCS: Performed by: GENERAL ACUTE CARE HOSPITAL

## 2022-11-25 PROCEDURE — 1200000000 HC SEMI PRIVATE

## 2022-11-25 RX ORDER — ENOXAPARIN SODIUM 100 MG/ML
40 INJECTION SUBCUTANEOUS DAILY
Qty: 11.2 ML | Refills: 0 | Status: SHIPPED | OUTPATIENT
Start: 2022-11-25 | End: 2022-12-23

## 2022-11-25 RX ORDER — OXYCODONE HYDROCHLORIDE 5 MG/1
5 TABLET ORAL EVERY 4 HOURS PRN
Qty: 30 TABLET | Refills: 0 | Status: SHIPPED | OUTPATIENT
Start: 2022-11-25 | End: 2022-12-02

## 2022-11-25 RX ADMIN — SODIUM CHLORIDE, PRESERVATIVE FREE 10 ML: 5 INJECTION INTRAVENOUS at 21:00

## 2022-11-25 RX ADMIN — Medication 100 MG: at 08:44

## 2022-11-25 RX ADMIN — SODIUM CHLORIDE, PRESERVATIVE FREE 10 ML: 5 INJECTION INTRAVENOUS at 08:45

## 2022-11-25 RX ADMIN — OXYCODONE 5 MG: 5 TABLET ORAL at 10:34

## 2022-11-25 RX ADMIN — SODIUM CHLORIDE, PRESERVATIVE FREE 10 ML: 5 INJECTION INTRAVENOUS at 00:32

## 2022-11-25 RX ADMIN — WATER 2000 MG: 1 INJECTION INTRAMUSCULAR; INTRAVENOUS; SUBCUTANEOUS at 00:32

## 2022-11-25 RX ADMIN — OXYCODONE 10 MG: 5 TABLET ORAL at 19:19

## 2022-11-25 RX ADMIN — OXYCODONE 10 MG: 5 TABLET ORAL at 06:18

## 2022-11-25 RX ADMIN — OXYCODONE 10 MG: 5 TABLET ORAL at 15:19

## 2022-11-25 RX ADMIN — ATORVASTATIN CALCIUM 20 MG: 20 TABLET, FILM COATED ORAL at 19:19

## 2022-11-25 RX ADMIN — MORPHINE SULFATE 4 MG: 4 INJECTION, SOLUTION INTRAMUSCULAR; INTRAVENOUS at 00:44

## 2022-11-25 ASSESSMENT — PAIN DESCRIPTION - FREQUENCY
FREQUENCY: CONTINUOUS
FREQUENCY: CONTINUOUS
FREQUENCY: INTERMITTENT
FREQUENCY: CONTINUOUS
FREQUENCY: INTERMITTENT

## 2022-11-25 ASSESSMENT — PAIN SCALES - GENERAL
PAINLEVEL_OUTOF10: 0
PAINLEVEL_OUTOF10: 9
PAINLEVEL_OUTOF10: 7
PAINLEVEL_OUTOF10: 6
PAINLEVEL_OUTOF10: 7
PAINLEVEL_OUTOF10: 7
PAINLEVEL_OUTOF10: 10
PAINLEVEL_OUTOF10: 8
PAINLEVEL_OUTOF10: 0
PAINLEVEL_OUTOF10: 8
PAINLEVEL_OUTOF10: 6

## 2022-11-25 ASSESSMENT — PAIN DESCRIPTION - PAIN TYPE
TYPE: SURGICAL PAIN

## 2022-11-25 ASSESSMENT — PAIN DESCRIPTION - DESCRIPTORS
DESCRIPTORS: DISCOMFORT
DESCRIPTORS: DISCOMFORT;NUMBNESS
DESCRIPTORS: DISCOMFORT;NUMBNESS
DESCRIPTORS: ACHING;DISCOMFORT
DESCRIPTORS: DISCOMFORT;SORE;TENDER
DESCRIPTORS: DISCOMFORT;NUMBNESS
DESCRIPTORS: ACHING;DISCOMFORT

## 2022-11-25 ASSESSMENT — PAIN - FUNCTIONAL ASSESSMENT
PAIN_FUNCTIONAL_ASSESSMENT: PREVENTS OR INTERFERES SOME ACTIVE ACTIVITIES AND ADLS

## 2022-11-25 ASSESSMENT — PAIN DESCRIPTION - ONSET
ONSET: ON-GOING

## 2022-11-25 ASSESSMENT — PAIN DESCRIPTION - LOCATION
LOCATION: HIP

## 2022-11-25 ASSESSMENT — PAIN DESCRIPTION - ORIENTATION
ORIENTATION: RIGHT

## 2022-11-25 NOTE — PROGRESS NOTES
Patient alert and oriented in bed resting with call light within reach. Complaint of Rt hip pain and medicated with prn medication per order. Will continue to monitor.

## 2022-11-25 NOTE — PROGRESS NOTES
Stood at bedside but would not put any weight on right foot. States she just cant. Was able to take few steps to bsc to void. Then stood while chairs were swapped out. Refused hip chair. Stated too high for her. Sitting up in chair. Call light in reach. Boyfriend at bedside. States pain level is improving. More calm at this time.

## 2022-11-25 NOTE — PROGRESS NOTES
Department of Orthopedic Surgery  Trauma / Fracture Care   Attending Progress Note        Subjective: Patient awake lying supine in bed. She verbalizes significant frustration with nursing from yesterday. She states she has been getting good care today. She had multiple questions, stating that she was frustrated with the need to take Lovenox subcu as well as therapy attempting to mobilize her. No apparent issues overnight.     Vitals  VITALS:  /62   Pulse 70   Temp 98.4 °F (36.9 °C) (Oral)   Resp 16   Wt 144 lb (65.3 kg)   SpO2 93%     PHYSICAL EXAM:    Orientation:  alert and oriented to person, place and time    Right Lower Extremity    Compartments: soft      Incision:  dressing in place, clean, dry, and intact    Upper extremity Motor: 5/5 axillary, mc,m,r,u,ain,pin    Upper extremity sensory: grossly in tact    Lower Extremity Motor :   Quadriceps:  5/5  Extensor hallucis:  5/5  Dorsiflexion:  5/5  Plantarflexion:  5/5    Lower Extremity Sensory:  Tibial Nerve:  intact  Superficial Peroneal Nerve:  intact  Deep Peroneal Nerve:  intact    Pulses:    Posterior Tibial:  2  Dorsalis Pedis:  2  Posterior Tibial Artery:  2    Abnormal Exam findings:  none      LABS:    HgB:    Lab Results   Component Value Date    HGB 10.0 8/28/2011     INR:    No components found with this basename: PTPATIENT, PTINR     CBC:   Lab Results   Component Value Date/Time    WBC 12.5 11/25/2022 05:45 AM    RBC 3.42 11/25/2022 05:45 AM    HGB 11.3 11/25/2022 05:45 AM    HCT 33.1 11/25/2022 05:45 AM    MCV 96.8 11/25/2022 05:45 AM    MCH 33.0 11/25/2022 05:45 AM    MCHC 34.1 11/25/2022 05:45 AM    RDW 13.1 11/25/2022 05:45 AM     11/25/2022 05:45 AM    MPV 9.8 11/25/2022 05:45 AM       ASSESSMENT AND PLAN:    Post operative day 1 status post right hip ORIF    1:  Weight bearing as tolerated  2:  Deep venous thrombosis prophylaxis -Lovenox 40 daily  3:  Continue physical therapy  4:  D/C Plan:  Skilled Nursing

## 2022-11-25 NOTE — PROGRESS NOTES
Ilia Boyce Hospitalist   Progress Note    Admitting Date and Time: 11/23/2022 11:11 AM  Admit Dx: Fall, initial encounter [W19. XXXA]  Closed displaced intertrochanteric fracture of right femur, initial encounter ([facility] [address] [S72.141A]  Intertrochanteric fracture of right femur, closed, initial encounter ([address] [S72.141A]    Subjective:    Patient was admitted with Fall, initial encounter [W19. XXXA]  Closed displaced intertrochanteric fracture of right femur, initial encounter ([address] [S72.141A]  Intertrochanteric fracture of right femur, closed, initial encounter ([facility] [address] Kasey Alvarenga.  Patient denies fever, chills, cp, sob, n/v.     sodium chloride flush  5-40 mL IntraVENous 2 times per day    polyethylene glycol  17 g Oral Daily    enoxaparin  40 mg SubCUTAneous Daily    atorvastatin  20 mg Oral Nightly    sodium chloride flush  5-40 mL IntraVENous 2 times per day    thiamine  100 mg Oral Daily     sodium chloride flush, 5-40 mL, PRN  sodium chloride, , PRN  ondansetron, 4 mg, Q8H PRN   Or  ondansetron, 4 mg, Q6H PRN  oxyCODONE, 5 mg, Q4H PRN   Or  oxyCODONE, 10 mg, Q4H PRN  morphine, 2 mg, Q2H PRN   Or  morphine, 4 mg, Q2H PRN  sodium chloride, , PRN  acetaminophen, 650 mg, Q6H PRN   Or  acetaminophen, 650 mg, Q6H PRN  sodium chloride, , PRN  sodium chloride, , PRN         Objective:    /62   Pulse 70   Temp 98.4 °F (36.9 °C) (Oral)   Resp 16   Wt 144 lb (65.3 kg)   SpO2 93%   Skin: warm and dry, no rash or erythema  Pulmonary/Chest: clear to auscultation bilaterally- no wheezes, rales or rhonchi, normal air movement, no respiratory distress  Cardiovascular: rhythm reg at rate of 72  Abdomen: soft, non-tender, non-distended, normal bowel sounds, no masses or organomegaly  Extremities: no cyanosis, no clubbing, and no edema      Recent Labs     11/23/22  1209 11/24/22  0415 11/25/22  0545    137 139   K 4.4 4.1 4.0    107 108*   CO2 20* 19* 21*   BUN 15 14 11   CREATININE 0.8 0.7 0.8 GLUCOSE 120* 122* 93   CALCIUM 9.3 9.1 9.2       Recent Labs     11/23/22  1209 11/24/22  0415 11/25/22  0545   WBC 14.9* 10.7 12.5*   RBC 4.10 3.81 3.42*   HGB 13.6 12.4 11.3*   HCT 40.2 37.4 33.1*   MCV 98.0 98.2 96.8   MCH 33.2 32.5 33.0   MCHC 33.8 33.2 34.1   RDW 12.9 13.1 13.1    230 207   MPV 9.3 9.7 9.8       CBC with Differential:    Lab Results   Component Value Date/Time    WBC 12.5 11/25/2022 05:45 AM    RBC 3.42 11/25/2022 05:45 AM    HGB 11.3 11/25/2022 05:45 AM    HCT 33.1 11/25/2022 05:45 AM     11/25/2022 05:45 AM    MCV 96.8 11/25/2022 05:45 AM    MCH 33.0 11/25/2022 05:45 AM    MCHC 34.1 11/25/2022 05:45 AM    RDW 13.1 11/25/2022 05:45 AM    LYMPHOPCT 17.3 11/25/2022 05:45 AM    MONOPCT 8.9 11/25/2022 05:45 AM    BASOPCT 0.2 11/25/2022 05:45 AM    MONOSABS 1.11 11/25/2022 05:45 AM    LYMPHSABS 2.16 11/25/2022 05:45 AM    EOSABS 0.00 11/25/2022 05:45 AM    BASOSABS 0.02 11/25/2022 05:45 AM     BMP:    Lab Results   Component Value Date/Time     11/25/2022 05:45 AM    K 4.0 11/25/2022 05:45 AM     11/25/2022 05:45 AM    CO2 21 11/25/2022 05:45 AM    BUN 11 11/25/2022 05:45 AM    LABALBU 4.3 11/23/2022 12:09 PM    CREATININE 0.8 11/25/2022 05:45 AM    CALCIUM 9.2 11/25/2022 05:45 AM    LABGLOM >60 11/25/2022 05:45 AM    GLUCOSE 93 11/25/2022 05:45 AM        Radiology:   Wilma Sprain FOR SURGICAL PROCEDURES   Final Result   Intraprocedural fluoroscopic spot images as above. See separate procedure   report for more information. XR CHEST PORTABLE   Final Result   No acute process. XR HIP 2-3 VW W PELVIS RIGHT   Final Result   Mildly displaced intertrochanteric fracture right hip with mild varus   angulation.              Assessment:    Principal Problem:    Intertrochanteric fracture of right femur, closed, initial encounter ([address]  Active Problems:    Leukocytosis    Acidosis    Hyperlipidemia    Closed displaced intertrochanteric fracture of right femur ([address] Alcohol abuse  Resolved Problems:    * No resolved hospital problems. *      Plan:  Right mildly displaced intertrochanteric fx of right hip pt s/p surg. Encourage activity per ortho. Encourage activity.  Encourage IS  Leukocytosis monitor for signs of infection  Acidosis monitor  Alcohol abuse monitor  Hyperlipidemia pt on statin  Elevated bp without dx of htn monitor        Electronically signed by Felicia Hickman DO on 11/25/2022 at 10:20 AM

## 2022-11-25 NOTE — DISCHARGE INSTRUCTIONS
Maintain clear dressings  Notify physician if there is any excessive drainage  Okay to shower dressings in place  You may remove dressings after 1 week  Leave for staple sites open to air. You may shower directly over staples, patting dry.   Avoid tub soaks  Take medications as directed  Follow-up in 2 weeks for staple removal  Weightbearing as tolerated with wheeled walker  PT/OT as tolerated      [facility] will see you after discharge   ph [phone]

## 2022-11-25 NOTE — PROGRESS NOTES
Occupational Therapy    OCCUPATIONAL THERAPY INITIAL EVALUATION     Nisreen Simms [place], [place]         OZGT:                                                  Patient Name: Magdalena Rosas    MRN: [id number]    : 1950    Room: [address] [place] [address]      Evaluating OT: Magdaleno Sandhoff OTR/L   [id number]      Referring Provider:Gonzalo Fuchs    Specific Provider Orders/Date:OT eval and treat 2022      Diagnosis:  Fall, initial encounter [W19. XXXA]  Closed displaced intertrochanteric fracture of right femur, initial encounter ([facility] [address] [place]  Intertrochanteric fracture of right femur, closed, initial encounter [facility]) [place]    Surgery: R hip ORIF 2022     Pertinent Medical History:  tobacco abuse, daily ETOH use    Precautions:  Fall Risk, WBAT R LE      Assessment of current deficits    [x] Functional mobility  [x]ADLs  [x] Strength               []Cognition    [x] Functional transfers   [x] IADLs         [x] Safety Awareness   [x]Endurance    [] Fine Coordination              [x] Balance      [] Vision/perception   []Sensation     []Gross Motor Coordination  [] ROM  [] Delirium                   [] Motor Control     OT PLAN OF CARE   OT POC based on physician orders, patient diagnosis and results of clinical assessment    Frequency/Duration  2-4 days/wk for 2 weeks PRN   Specific OT Treatment Interventions to include:   ADL retraining/adapted techniques and AE recommendations to increase functional independence within precautions                    Energy conservation techniques to improve tolerance for selfcare routine   Functional transfer/mobility training/DME recommendations for increased independence, safety and fall prevention         Patient/family education to increase safety and functional independence             Environmental modifications for safe mobility and completion of ADLs                             Therapeutic activity to improve functional performance during ADLs. Therapeutic exercise to improve tolerance and functional strength for ADLs    Balance retraining/tolerance tasks for facilitation of postural control with dynamic challenges during ADLs .       Positioning to improve functional independence      Recommended Adaptive Equipment: TBD     Home Living: Pt lives alone, 2 story and 4 steps/rail to enter  first floor set-up    Prior Level of Function: Independent  with ADLs , Independent  with IADLs; ambulated with no device     Pain Level: R hip pain and numbness ;   Cognition: A&O: patient conversing   encouragement and reassurance given throughout session    Memory:  fair   Sequencing:  fair    Problem solving:  fair    Judgement/safety:  fair -     Functional Assessment:  AM-PAC Daily Activity Raw Score: 15/24   Initial Eval Status  Date: 11/25/22 Treatment Status  Date: STGs = LTGs  Time frame: 10-14 days   Feeding Independent      Grooming SBA/set-up,seated   Decrease standing tolerance   Mod I    UB Dressing Min A  Managing hospital gown as a robe   Mod I    LB Dressing Max  A  Supervision    Bathing Mod A   Supervision    Toileting Mod A   Supervision    Bed Mobility  Max A  Supine to sit   Supervision    Functional Transfers Min A  Sit- stand from elevated bed   Educated on txfer tech and hand placement  Supervision    Functional Mobility Min A,w/walker   Walking short distance only in room   Supervision  with good tolerance    Balance Sitting:     Static:  CGA/SBA - occasional posterior LOB - patient reporting its from the pain     Dynamic:Min A   Standing: Min A   Independent/supervision    Activity Tolerance Patient self limiting   No SOB   Good  with ADL activity    Visual/  Perceptual Glasses: none by bedside                 Hand Dominance right   AROM (PROM) Strength Additional Info:    RUE  WFL WFL good  and wfl FMC/dexterity noted during ADL tasks       WADE WFL WFL good  and wfl FMC/dexterity noted during ADL tasks       Hearing: WFL   Sensation:  No c/o numbness or tingling  Tone: WFL   Edema: none observed     Comments: Upon arrival patient lying in bed . At end of session, patient sitting in chair with call light and phone within reach, all lines and tubes intact. Family members present    Overall patient demonstrated  decreased independence and safety during completion of ADL/functional transfer/mobility tasks. Pt would benefit from continued skilled OT to increase safety and independence with completion of ADL/IADL tasks for functional independence and quality of life. Rehab Potential: good for established goals     Patient / Family Goal: none stated       Patient and/or family were instructed on functional diagnosis, prognosis/goals and OT plan of care. Demonstrated good  understanding. Eval Complexity: Low    Time In: 0805  Time Out: 0820      Min Units   OT Eval Low 97165 x  1   OT Eval Medium 20998      OT Eval High 37441      OT Re-Eval [id number]       Therapeutic Ex [id number]      Therapeutic Activities 11962       ADL/Self Care 37840       Orthotic Management 15070       Manual [id number]     Neuro Re-Ed 81443       Non-Billable Time         Evaluation Time additionally includes thorough review of current medical information, gathering information on past medical history/social history and prior level of function, interpretation of standardized testing/informal observation of tasks, assessment of data and development of plan of care and goals.             Jacklyn Stauffer  OTR/L  OT [id number]

## 2022-11-25 NOTE — PROGRESS NOTES
Upon meeting patient with night rn, patient verbally abusive and difficult to communicate with. States she has \"no idea what is going on and she is getting no answers about her care\"   Attempted to explain to patient calmly what typical post op procedure consisted of and that I am here to help her and get answers to all of her questions. She rudely asked both nurses to leave room and she did not want us to do anything at this time.  Call out to dr Bandar Lam regarding patients request

## 2022-11-25 NOTE — CARE COORDINATION
CM made in room visit to patient and introduced my self and provided explanation of CM role to patient. Pt resides in a two story home and is independent w/o the use of DMEs. PT/OT: 15/24. SNF/HHC list given to patient. Only facility able to accommodate smoking in [facility], pt aware. Ansley from [facility] verifies pt has Medicare A only. Pt states she is unsure if she wants to go to any facility. If pt chooses a facility, [facility], transport form and [id number] will need completed. CM will follow along with  and assist with discharge planning as necessary.    Lou Rubio, JOSE ELIASN, RN  [facility] Case Management  [phone]

## 2022-11-25 NOTE — PROGRESS NOTES
10 mg oxycodone administered. Patient extremely aggravated. Complaining about bed. States pain was too severe to get up. Reminded patient that I had to encourage her to take 5mg of oxycodone earlier and she slept very well after. Was asking patient if she voided at all today when up with therapy or with pca. Gets very frustrated with questioning. States she thinks so but is not sure. She cannot remember. Denies urge to urinate. Dr Romana Loose in room to see patient. Will follow up with urination.

## 2022-11-25 NOTE — PROGRESS NOTES
Attempted to get patient up to chair. Boyfriend present. Sat at bedside, then stood up briefly with walker putting very little weight to right leg. She got very anxious and said \"I cant do it, it hurts too bad. I need to sit back down now. \". assisted to sit at side of bed. Call light in reach. No distress from pain noted, just continued to state, 'i just cant do it!' Stated they would call when ready to lie back down in bed.

## 2022-11-25 NOTE — PROGRESS NOTES
Patient sleeping in bed with call light within reach. Dressing dry and intact to Rt hip. Will continue to monitor.

## 2022-11-25 NOTE — PROGRESS NOTES
Patient states she does not want to take any pain medication. States it made her lightheaded. Explained to patient it would be best for her to take something to keep the pain in check. Refusing tylenol but agreed to 5 mg oxycodone.

## 2022-11-25 NOTE — PLAN OF CARE
Problem: Discharge Planning  Goal: Discharge to home or other facility with appropriate resources  11/25/2022 1059 by Italia Snaford RN  Outcome: Progressing     Problem: Skin/Tissue Integrity  Goal: Absence of new skin breakdown  Description: 1. Monitor for areas of redness and/or skin breakdown  2. Assess vascular access sites hourly  3. Every 4-6 hours minimum:  Change oxygen saturation probe site  4. Every 4-6 hours:  If on nasal continuous positive airway pressure, respiratory therapy assess nares and determine need for appliance change or resting period.   11/25/2022 1059 by Italia Sanford RN  Outcome: Progressing     Problem: Safety - Adult  Goal: Free from fall injury  11/25/2022 1059 by Italia Sanford RN  Outcome: Progressing     Problem: ABCDS Injury Assessment  Goal: Absence of physical injury  11/25/2022 1059 by Italia Sanford RN  Outcome: Progressing     Problem: Pain  Goal: Verbalizes/displays adequate comfort level or baseline comfort level  11/25/2022 1059 by Italia Sanford RN  Outcome: Progressing

## 2022-11-25 NOTE — PROGRESS NOTES
Physical Therapy  Facility/Department: [facility] SURGERY  Physical Therapy Initial Assessment    Name: Magdalena Rosas  : 1950  MRN: [id number]  Date of Service: 2022    Attending Provider:  Darrel Rust DO    Evaluating PT:  Artis Henning P.TJase Room #:  7910/8653-K  Diagnosis:  Fall, initial encounter [W19. XXXA]  Closed displaced intertrochanteric fracture of right femur, initial encounter ([facility] [address] [S72.141A]  Intertrochanteric fracture of right femur, closed, initial encounter ([facility] [address] [place], tripped and fell at home. Pertinent PMHx/PSHx:  tobacco abuse, daily ETOH use  Procedure/Surgery:  22 R hip ORIF  Precautions:  WBAT RLE, falls, bed/chair alarm  Equipment Needs:  wheeled walker    SUBJECTIVE:    Pt lives alone in a 2 story home with 4 stairs and 1 rail to enter. She has first floor set up. Pt ambulated with no AD PTA. OBJECTIVE:   Initial Evaluation  Date: 22 Treatment Short Term/ Long Term   Goals   Was pt agreeable to Eval/treatment? yes     Does pt have pain? No c/o pain at rest, c/o severe RLE pain with movement and WB     Bed Mobility  Rolling: MOD A  Supine to sit: MAX A  Sit to supine: NA  Scooting: MAX A  Independent    Transfers Sit to stand: MIN A  Stand to sit: MIN A  Stand pivot: MIN A with ww  Independent    Ambulation   25 feet with ww MIN A  150 feet with ww Independent    Stair negotiation: ascended and descended NA  4 steps with 1 rail and cane if needed supervision   AM-PAC 6 Clicks 42/15       BLE ROM is WFL, except RLE is limited due to pain. LLE strength is grossly 4+/5 and R hip is 2-/5 to 3-/5, knee 3-/5, ankle 4-/5.    Sensation:  Pt c/o numbness to RLE  Balance: sitting is SBA/MIN A due to occasional posterior lean and standing with ww is MIN A  Endurance: fair    Patient education  Pt educated on bed mobility, transfers, gait sequence with ww, and WBAT RLE    Patient response to education:   Pt verbalized understanding Pt demonstrated skill Pt requires further education in this area   yes yes yes     ASSESSMENT:    Conditions Requiring Skilled Therapeutic Intervention:    [x]Decreased strength     [x]Decreased ROM  [x]Decreased functional mobility  [x]Decreased balance   [x]Decreased endurance   []Decreased posture  []Decreased sensation  []Decreased coordination   []Decreased vision  []Decreased safety awareness   [x]Increased pain       Comments:  Pt was in bed and agreeable to PT. Pt had 3 visitors present throughout PT session. Pt had increased pain RLE with movement and WB that limited movement and she required assist with activity. Once sitting on EOB she had occasional posterior LOB and MIN A was provided to assist recovery. Pt walked with ww with slow step too gait and had increased pain that slowed pt down and limited further amb distance at this time. At times pt needed reminded not to get too close to front of ww. Pt is a fall risk at this time and should not get up on her own right now. Treatment:  Patient practiced and was instructed in the following treatment:    Bed mobility, transfers, and gait with ww to improve functional strength, balance, safety, and endurance. Pt was left sitting up in chair on waffle cushion with call light left by patient. Chair/bed alarm: chair alarm was activated and visitors and RN were present with pt.     Pt's/ family goals   1. To go home. Patient and or family understand(s) diagnosis, prognosis, and plan of care. PHYSICAL THERAPY PLAN OF CARE:    PT POC is established based on physician order and patient diagnosis     Referring provider/PT Order:  PT eval and treat  Diagnosis:  Fall, initial encounter [W19. XXXA]  Closed displaced intertrochanteric fracture of right femur, initial encounter ([facility] [address] [S72.141A]  Intertrochanteric fracture of right femur, closed, initial encounter ([facility] [address] [S72.141A]  Specific instructions for next treatment:  increase amb distance as pt is able.    Current Treatment Recommendations:     [x] Strengthening to improve independence with functional mobility   [x] ROM to improve ROM and decrease spasm and pain which will help promote independence with functional mobility   [x] Balance Training to improve static/dynamic balance and to reduce fall risk  [x] Endurance Training to improve activity tolerance during functional mobility   [x] Transfer Training to improve safety and independence with all functional transfers   [x] Gait Training to improve gait mechanics, endurance and assess need for appropriate assistive device  [x] Stair Training in preparation for safe discharge home and/or into the community   [] Positioning to prevent skin breakdown and contractures  [] Safety and Education Training   [x] Patient/Caregiver Education   [x] HEP  [] Other     PT long term treatment goals are located in above grid    Frequency of treatments: 2-5x/week x 1-2 weeks. Time in  08:10  Time out  08:40    Total Treatment Time  15 minutes     Evaluation Time includes thorough review of current medical information, gathering information on past medical history/social history and prior level of function, completion of standardized testing/informal observation of tasks, assessment of data and education on plan of care and goals. CPT codes:  [x] Low Complexity PT evaluation 96441  [] Moderate Complexity PT evaluation 45147  [] High Complexity PT evaluation 05111  [] PT Re-evaluation 48939  [] Gait training 07083 ** minutes  [] Manual therapy 77004 ** minutes  [x] Therapeutic activities 41537 15 minutes  [] Therapeutic exercises 07682 ** minutes  [] Neuromuscular reeducation 08751 ** minutes     Sean Dee., P.T.   License Number: [id number]

## 2022-11-26 PROBLEM — D62 ANEMIA ASSOCIATED WITH ACUTE BLOOD LOSS: Status: ACTIVE | Noted: 2022-11-26

## 2022-11-26 LAB
ANION GAP SERPL CALCULATED.3IONS-SCNC: 8 MMOL/L (ref 7–16)
BASOPHILS ABSOLUTE: 0.04 E9/L (ref 0–0.2)
BASOPHILS RELATIVE PERCENT: 0.4 % (ref 0–2)
BUN BLDV-MCNC: 14 MG/DL (ref 6–23)
CALCIUM SERPL-MCNC: 8.5 MG/DL (ref 8.6–10.2)
CHLORIDE BLD-SCNC: 107 MMOL/L (ref 98–107)
CO2: 20 MMOL/L (ref 22–29)
CREAT SERPL-MCNC: 0.8 MG/DL (ref 0.5–1)
EOSINOPHILS ABSOLUTE: 0.15 E9/L (ref 0.05–0.5)
EOSINOPHILS RELATIVE PERCENT: 1.7 % (ref 0–6)
GFR SERPL CREATININE-BSD FRML MDRD: >60 ML/MIN/1.73
GLUCOSE BLD-MCNC: 104 MG/DL (ref 74–99)
HCT VFR BLD CALC: 29.2 % (ref 34–48)
HEMOGLOBIN: 9.9 G/DL (ref 11.5–15.5)
IMMATURE GRANULOCYTES #: 0.04 E9/L
IMMATURE GRANULOCYTES %: 0.4 % (ref 0–5)
LYMPHOCYTES ABSOLUTE: 2.45 E9/L (ref 1.5–4)
LYMPHOCYTES RELATIVE PERCENT: 27 % (ref 20–42)
MCH RBC QN AUTO: 33.8 PG (ref 26–35)
MCHC RBC AUTO-ENTMCNC: 33.9 % (ref 32–34.5)
MCV RBC AUTO: 99.7 FL (ref 80–99.9)
MONOCYTES ABSOLUTE: 0.63 E9/L (ref 0.1–0.95)
MONOCYTES RELATIVE PERCENT: 6.9 % (ref 2–12)
NEUTROPHILS ABSOLUTE: 5.78 E9/L (ref 1.8–7.3)
NEUTROPHILS RELATIVE PERCENT: 63.6 % (ref 43–80)
PDW BLD-RTO: 13.2 FL (ref 11.5–15)
PLATELET # BLD: 154 E9/L (ref 130–450)
PMV BLD AUTO: 9.9 FL (ref 7–12)
POTASSIUM REFLEX MAGNESIUM: 3.8 MMOL/L (ref 3.5–5)
RBC # BLD: 2.93 E12/L (ref 3.5–5.5)
SODIUM BLD-SCNC: 135 MMOL/L (ref 132–146)
WBC # BLD: 9.1 E9/L (ref 4.5–11.5)

## 2022-11-26 PROCEDURE — 6370000000 HC RX 637 (ALT 250 FOR IP): Performed by: GENERAL ACUTE CARE HOSPITAL

## 2022-11-26 PROCEDURE — 97530 THERAPEUTIC ACTIVITIES: CPT

## 2022-11-26 PROCEDURE — 80048 BASIC METABOLIC PNL TOTAL CA: CPT

## 2022-11-26 PROCEDURE — 36415 COLL VENOUS BLD VENIPUNCTURE: CPT

## 2022-11-26 PROCEDURE — 85025 COMPLETE CBC W/AUTO DIFF WBC: CPT

## 2022-11-26 PROCEDURE — 1200000000 HC SEMI PRIVATE

## 2022-11-26 PROCEDURE — 97116 GAIT TRAINING THERAPY: CPT

## 2022-11-26 PROCEDURE — 6370000000 HC RX 637 (ALT 250 FOR IP)

## 2022-11-26 PROCEDURE — 99233 SBSQ HOSP IP/OBS HIGH 50: CPT | Performed by: INTERNAL MEDICINE

## 2022-11-26 PROCEDURE — 6370000000 HC RX 637 (ALT 250 FOR IP): Performed by: NURSE PRACTITIONER

## 2022-11-26 PROCEDURE — 2580000003 HC RX 258: Performed by: GENERAL ACUTE CARE HOSPITAL

## 2022-11-26 RX ADMIN — ATORVASTATIN CALCIUM 20 MG: 20 TABLET, FILM COATED ORAL at 19:21

## 2022-11-26 RX ADMIN — Medication 100 MG: at 08:43

## 2022-11-26 RX ADMIN — OXYCODONE 10 MG: 5 TABLET ORAL at 19:21

## 2022-11-26 RX ADMIN — OXYCODONE 10 MG: 5 TABLET ORAL at 00:32

## 2022-11-26 RX ADMIN — OXYCODONE 10 MG: 5 TABLET ORAL at 06:34

## 2022-11-26 RX ADMIN — OXYCODONE 10 MG: 5 TABLET ORAL at 12:45

## 2022-11-26 RX ADMIN — SODIUM CHLORIDE, PRESERVATIVE FREE 10 ML: 5 INJECTION INTRAVENOUS at 20:27

## 2022-11-26 RX ADMIN — SODIUM CHLORIDE, PRESERVATIVE FREE 10 ML: 5 INJECTION INTRAVENOUS at 08:44

## 2022-11-26 RX ADMIN — ASPIRIN 325 MG: 325 TABLET, COATED ORAL at 20:26

## 2022-11-26 RX ADMIN — ASPIRIN 325 MG: 325 TABLET, COATED ORAL at 11:10

## 2022-11-26 RX ADMIN — POLYETHYLENE GLYCOL 3350 17 G: 17 POWDER, FOR SOLUTION ORAL at 08:43

## 2022-11-26 ASSESSMENT — PAIN DESCRIPTION - LOCATION
LOCATION: HIP

## 2022-11-26 ASSESSMENT — PAIN DESCRIPTION - FREQUENCY
FREQUENCY: CONTINUOUS
FREQUENCY: INTERMITTENT

## 2022-11-26 ASSESSMENT — PAIN SCALES - GENERAL
PAINLEVEL_OUTOF10: 10
PAINLEVEL_OUTOF10: 5
PAINLEVEL_OUTOF10: 8
PAINLEVEL_OUTOF10: 0
PAINLEVEL_OUTOF10: 0
PAINLEVEL_OUTOF10: 8
PAINLEVEL_OUTOF10: 3

## 2022-11-26 ASSESSMENT — PAIN DESCRIPTION - ORIENTATION
ORIENTATION: RIGHT

## 2022-11-26 ASSESSMENT — PAIN DESCRIPTION - ONSET
ONSET: ON-GOING

## 2022-11-26 ASSESSMENT — PAIN DESCRIPTION - DESCRIPTORS
DESCRIPTORS: ACHING;DISCOMFORT;SORE
DESCRIPTORS: ACHING;DISCOMFORT;NUMBNESS
DESCRIPTORS: ACHING;DISCOMFORT;SORE
DESCRIPTORS: ACHING;DISCOMFORT;SORE

## 2022-11-26 ASSESSMENT — PAIN - FUNCTIONAL ASSESSMENT
PAIN_FUNCTIONAL_ASSESSMENT: PREVENTS OR INTERFERES SOME ACTIVE ACTIVITIES AND ADLS
PAIN_FUNCTIONAL_ASSESSMENT: PREVENTS OR INTERFERES SOME ACTIVE ACTIVITIES AND ADLS
PAIN_FUNCTIONAL_ASSESSMENT: ACTIVITIES ARE NOT PREVENTED
PAIN_FUNCTIONAL_ASSESSMENT: PREVENTS OR INTERFERES SOME ACTIVE ACTIVITIES AND ADLS

## 2022-11-26 ASSESSMENT — PAIN DESCRIPTION - PAIN TYPE
TYPE: SURGICAL PAIN

## 2022-11-26 NOTE — PROGRESS NOTES
Physical Therapy  Facility/Department: [facility] SURGERY  Daily Treatment Note  NAME: Isaak Hernandez  : 1950  MRN: [id number]    Date of Service: 2022    Attending Provider:  Marvel Mcguire DO     Evaluating PT:  Zechariah Chapman. Cassidy Bhatia P.T. Room #:  8671/0326-N  Diagnosis:  Fall, initial encounter [W19. XXXA]  Closed displaced intertrochanteric fracture of right femur, initial encounter ([facility] [address] [S72.141A]  Intertrochanteric fracture of right femur, closed, initial encounter ([facility] [address] [place], tripped and fell at home. Pertinent PMHx/PSHx:  tobacco abuse, daily ETOH use  Procedure/Surgery:  22 R hip ORIF  Precautions:  WBAT RLE, falls, bed/chair alarm  Equipment Needs:  wheeled walker     SUBJECTIVE:     Pt lives alone in a 2 story home with 4 stairs and 1 rail to enter. She has first floor set up. Pt ambulated with no AD PTA. OBJECTIVE:    Initial Evaluation  Date: 22 Treatment  2022 Short Term/ Long Term   Goals   Was pt agreeable to Eval/treatment? yes  yes     Does pt have pain?  No c/o pain at rest, c/o severe RLE pain with movement and WB  minimal R hip pain with mobility      Bed Mobility  Rolling: MOD A  Supine to sit: MAX A  Sit to supine: NA  Scooting: MAX A  supine to sit : min assist  Independent    Transfers Sit to stand: MIN A  Stand to sit: MIN A  Stand pivot: MIN A with ww  sit <> stand: min assist with elevated bed  Independent    Ambulation   25 feet with ww MIN A  7 feet x 1 with ww CGA/min assist  150 feet with ww Independent    Stair negotiation: ascended and descended NA   4 steps with 1 rail and cane if needed supervision   AM-PAC 6 Clicks 83/82  43/ 24           Balance: sitting is S/I, and standing with ww is CGA/MIN A  Endurance: fair     Patient education  Pt educated on bed mobility, transfers, gait sequence with ww, and WBAT RLE     Patient response to education:   Pt verbalized understanding Pt demonstrated skill Pt requires further education in this area   yes Needs cues  yes      ASSESSMENT:     Conditions Requiring Skilled Therapeutic Intervention:     [x]Decreased strength                                      [x]Decreased ROM  [x]Decreased functional mobility  [x]Decreased balance   [x]Decreased endurance   []Decreased posture  []Decreased sensation  []Decreased coordination   []Decreased vision  []Decreased safety awareness   [x]Increased pain             Comments:  Pt was in bed and agreeable to PT. Granddaughter present for session. instructed in supine APs, QS, and GS. Pt required increased time with all mobility and instruction for technique. Needed min assist with R LE with bed mobility. Cues given for hand and foot placement with transfers and sequencing with gait. Instructed not to get too close to ww with gait. Limited gait as pt felt mildly nauseated and stated it was \"too much. \"      Treatment:  Patient practiced and was instructed in the following treatment:    Bed mobility, transfers, and gait with ww to improve functional strength, balance, safety, and endurance. Pt was left sitting up in chair on waffle cushion with call light left by patient. Chair/bed alarm: chair alarm was activated and visitor present           PHYSICAL THERAPY PLAN OF CARE:     PT POC is established based on physician order and patient diagnosis      Referring provider/PT Order:  PT eval and treat  Diagnosis:  Fall, initial encounter [W19. XXXA]  Closed displaced intertrochanteric fracture of right femur, initial encounter ([facility] [address] [S72.141A]  Intertrochanteric fracture of right femur, closed, initial encounter ([facility] [address] [S72.141A]  Specific instructions for next treatment:  increase amb distance as pt is able.      Current Treatment Recommendations:      [x] Strengthening to improve independence with functional mobility   [x] ROM to improve ROM and decrease spasm and pain which will help promote independence with functional mobility   [x] Balance Training to improve static/dynamic balance and to reduce fall risk  [x] Endurance Training to improve activity tolerance during functional mobility   [x] Transfer Training to improve safety and independence with all functional transfers   [x] Gait Training to improve gait mechanics, endurance and assess need for appropriate assistive device  [x] Stair Training in preparation for safe discharge home and/or into the community   [] Positioning to prevent skin breakdown and contractures  [] Safety and Education Training   [x] Patient/Caregiver Education   [x] HEP  [] Other      PT long term treatment goals are located in above grid     Frequency of treatments: 2-5x/week x 1-2 weeks.      Time in  0742  Time out  0810     Total Treatment Time  25 minutes      Con't with PT POC with emphasis on safe mobility      CPT codes:  [] Low Complexity PT evaluation 25783  [] Moderate Complexity PT evaluation 48959  [] High Complexity PT evaluation 26735  [] PT Re-evaluation 82684  [] Gait training 07349 ** minutes  [] Manual therapy 41603 ** minutes  [x] Therapeutic activities 38399 25 minutes  [] Therapeutic exercises 59461 ** minutes  [] Neuromuscular reeducation 50815 ** minutes     Yana Branch

## 2022-11-26 NOTE — PROGRESS NOTES
I had a discussion with nursing this morning. Patient is adamantly refusing her Lovenox. We both believe that it is a virtual certainty that she will not take this medication as prescribed upon discharge. I explained to the patient yesterday the importance of Lovenox, but this does not seem to have changed her mind or created any headway. Accordingly, we are going to switch her to aspirin 325 twice daily. While this medication is not as optimal as Lovenox and preventing DVT, I believe that the patient will be much more likely to demonstrate compliance with this oral medication compared to the subcu injection. As long as she is mobile and continues with therapy as recommended, I do believe that the aspirin will be sufficient to prevent DVT postoperatively.

## 2022-11-26 NOTE — PLAN OF CARE
Problem: Discharge Planning  Goal: Discharge to home or other facility with appropriate resources  11/25/2022 2358 by Andrea Sanchez RN  Outcome: Progressing  11/25/2022 1059 by Regina Mehta RN  Outcome: Progressing     Problem: Skin/Tissue Integrity  Goal: Absence of new skin breakdown  Description: 1. Monitor for areas of redness and/or skin breakdown  2. Assess vascular access sites hourly  3. Every 4-6 hours minimum:  Change oxygen saturation probe site  4. Every 4-6 hours:  If on nasal continuous positive airway pressure, respiratory therapy assess nares and determine need for appliance change or resting period.   11/25/2022 2358 by Andrea Sanchez RN  Outcome: Progressing  11/25/2022 1059 by Regina Mehta RN  Outcome: Progressing     Problem: Safety - Adult  Goal: Free from fall injury  11/25/2022 2358 by Andrea Sanchez RN  Outcome: Progressing  11/25/2022 1059 by Regina Mehta RN  Outcome: Progressing     Problem: ABCDS Injury Assessment  Goal: Absence of physical injury  11/25/2022 2358 by Andrea Sanchez RN  Outcome: Progressing  11/25/2022 1059 by Regina Mehta RN  Outcome: Progressing     Problem: Pain  Goal: Verbalizes/displays adequate comfort level or baseline comfort level  11/25/2022 2358 by Andrea Sanchez RN  Outcome: Progressing  Flowsheets (Taken 11/25/2022 2000)  Verbalizes/displays adequate comfort level or baseline comfort level:   Encourage patient to monitor pain and request assistance   Assess pain using appropriate pain scale   Administer analgesics based on type and severity of pain and evaluate response  11/25/2022 1059 by Regina Mehta RN  Outcome: Progressing

## 2022-11-26 NOTE — PROGRESS NOTES
OT BEDSIDE TREATMENT NOTE     [facility]  Patient Name: Chago Blevins  MRN: [id number]  : 1950  Room: [address] [place] [address]     Evaluating OT: Mitesh Cooper OTR/L   [id number]       Referring Provider:Gonzalo Fuchs    Specific Provider Orders/Date:OT eval and treat 2022       Diagnosis:  Fall, initial encounter [W19. XXXA]  Closed displaced intertrochanteric fracture of right femur, initial encounter ([facility] [address] Foy Riddles  Intertrochanteric fracture of right femur, closed, initial encounter [facility]) Foy Riddles    Surgery: R hip ORIF 2022     Pertinent Medical History:  tobacco abuse, daily ETOH use    Precautions:  Fall Risk, WBAT R LE       Assessment of current deficits    [x] Functional mobility            [x]ADLs           [x] Strength                  []Cognition    [x] Functional transfers          [x] IADLs         [x] Safety Awareness   [x]Endurance    [] Fine Coordination                         [x] Balance      [] Vision/perception   []Sensation      []Gross Motor Coordination             [] ROM           [] Delirium                   [] Motor Control      OT PLAN OF CARE   OT POC based on physician orders, patient diagnosis and results of clinical assessment     Frequency/Duration  2-4 days/wk for 2 weeks PRN   Specific OT Treatment Interventions to include:   ADL retraining/adapted techniques and AE recommendations to increase functional independence within precautions                    Energy conservation techniques to improve tolerance for selfcare routine   Functional transfer/mobility training/DME recommendations for increased independence, safety and fall prevention         Patient/family education to increase safety and functional independence             Environmental modifications for safe mobility and completion of ADLs                             Therapeutic activity to improve functional performance during ADLs. Therapeutic exercise to improve tolerance and functional strength for ADLs    Balance retraining/tolerance tasks for facilitation of postural control with dynamic challenges during ADLs .       Positioning to improve functional independence        Recommended Adaptive Equipment: TBD      Home Living: Pt lives alone, 2 story and 4 steps/rail to enter  first floor set-up     Prior Level of Function: Independent  with ADLs , Independent  with IADLs; ambulated with no device      Pain Level: R hip pain and numbness ;   Cognition: A&O: patient conversing   encouragement and reassurance given throughout session               Memory:  fair              Sequencing:  fair               Problem solving:  fair               Judgement/safety:  fair -                Functional Assessment:  AM-PAC Daily Activity Raw Score: 15/24    Initial Eval Status  Date: 11/25/22 Treatment Status  Date: 11/26/2022 STGs = LTGs  Time frame: 10-14 days   Feeding Independent        Grooming SBA/set-up,seated   Decrease standing tolerance    Mod I    UB Dressing Min A  Managing hospital gown as a robe    Mod I    LB Dressing Max  A Pt and granddaughter instructed in LB dressing strategies, but pt did not actively participate in task  Supervision    Bathing Mod A    Supervision    Toileting Mod A    Supervision    Bed Mobility  Max A  Supine to sit   Min A with slow steady progress  Supervision    Functional Transfers Min A  Sit- stand from elevated bed   Educated on txfer tech and hand placement  Min A from elevated bed sit to stand with fww  Supervision    Functional Mobility Min A,w/walker   Walking short distance only in room  Min A with FWW in room aprox 7 feet with FWW  Supervision  with good tolerance    Balance Sitting:     Static:  CGA/SBA - occasional posterior LOB - patient reporting its from the pain     Dynamic:Min A   Standing: Min A  Dynamic- Min A Independent/supervision    Activity Tolerance Patient self limiting   No SOB   Pt unable to complete further distance or tasks due to pain  Good  with ADL activity    Visual/  Perceptual Glasses: none by bedside                          Comments: Upon arrival pt supine in bed. Pt educated on techniques to increase independence and safety during ADL's, bed mobility, and functional transfers. At end of session pt left seated in bedside chair, call light within reach and grand daughter present    Pt has made good progress towards set goals.      Continue with current plan of care    Treatment Time In: 7:42            Treatment Time Out: 8:10             Treatment Charges: Mins Units   Ther Ex  82035     Manual Therapy Ottoniel Gilbert [phone]     ADL/Home Mgt 87628     Neuro Re-ed 01262     Group Therapy      Orthotic manage/training  40396     Non-Billable Time     Total Timed Treatment 25 [address]600 [address], OTR/L [id number]

## 2022-11-26 NOTE — PROGRESS NOTES
[facility] Hospitalist   Progress Note    Admitting Date and Time: 11/23/2022 11:11 AM  Admit Dx: Fall, initial encounter [W19. XXXA]  Closed displaced intertrochanteric fracture of right femur, initial encounter ([facility] [address] [S72.141A]  Intertrochanteric fracture of right femur, closed, initial encounter ([facility] [address] [S72.141A]    Subjective:    Patient was admitted with Fall, initial encounter [W19. XXXA]  Closed displaced intertrochanteric fracture of right femur, initial encounter ([facility] [address] [S72.141A]  Intertrochanteric fracture of right femur, closed, initial encounter ([facility] [address] Olga Agarwal. Patient denies fever, chills, cp, sob, n/v.     sodium chloride flush  5-40 mL IntraVENous 2 times per day    polyethylene glycol  17 g Oral Daily    enoxaparin  40 mg SubCUTAneous Daily    atorvastatin  20 mg Oral Nightly    sodium chloride flush  5-40 mL IntraVENous 2 times per day    thiamine  100 mg Oral Daily     sodium chloride flush, 5-40 mL, PRN  sodium chloride, , PRN  ondansetron, 4 mg, Q8H PRN   Or  ondansetron, 4 mg, Q6H PRN  oxyCODONE, 5 mg, Q4H PRN   Or  oxyCODONE, 10 mg, Q4H PRN  morphine, 2 mg, Q2H PRN   Or  morphine, 4 mg, Q2H PRN  sodium chloride, , PRN  acetaminophen, 650 mg, Q6H PRN   Or  acetaminophen, 650 mg, Q6H PRN  sodium chloride, , PRN  sodium chloride, , PRN         Objective:        PHYSICAL EXAM:    Vitals:  BP (!) 126/56   Pulse 84   Temp 99.7 °F (37.6 °C) (Oral)   Resp 16   Wt 144 lb (65.3 kg)   SpO2 94%     General:  Appears comfortable. Answers questions appropriately and cooperative with exam  HEENT:  Mucous membranes moist. No erythema, rhinorrhea, or post-nasal drip noted. Neck:  No carotid bruits. Heart:  Rhythm regular at rate of 88  Lungs:  CTA. No wheeze, rales, or rhonchi  Abdomen:  Positive bowel sounds positive. Soft. Non-tender. No guarding, rebound or rigidity. Breast/Rectal/Genitourinary: not pertinent.     Extremities:  Negative for lower extremity edema  Skin:  Warm and fracture right hip with mild varus   angulation. Assessment:    Principal Problem:    Intertrochanteric fracture of right femur, closed, initial encounter ([address]  Active Problems:    Leukocytosis    Acidosis    Hyperlipidemia    Closed displaced intertrochanteric fracture of right femur (HCC)    Alcohol abuse  Resolved Problems:    * No resolved hospital problems. *      Plan:  Right mildly displaced intertrochanteric fx of right hip pt s/p surg. Encourage activity per ortho. Encourage activity. Encourage IS  Anemia with acute blood loss component monitor hgb and transfuse prn  Leukocytosis monitor for signs of infection   improving  Acidosis monitor  Alcohol abuse monitor  Hyperlipidemia pt on statin  Elevated bp without dx of htn monitor    Chart reviewed and updated by nursing    Long d/w pt with nurse present. Discussed importance of IS and gave recognition to the pt that she had just used it prior to seeing her today. Discussed my concern for dvt for her. Discussed about dvt and why she is at increased risk for blood clot and what we are trying to help prevent. Discussed the importance of mobility. Discussed the role of SCD and lovenox in helping to prevent blood clot. Discussed about medication for symptomatic management. Time given for questions and all questions answered. Pt was agreeable to placing scd's on and will think about the lovenox shots. Scd's were placed on pt while I was still in the room.      Time spent is 35 min    Electronically signed by Vilma Zarco DO on 11/26/2022 at 7:16 AM

## 2022-11-26 NOTE — CARE COORDINATION
Social work / Discharge Planning:          RN contacted social work for assistance with discharge planning. Social work spoke to patient who states that she has Medicare and VA benefits. Patient states she does not have her Medicare card and does not know her number. Patient plans for discharge home when medically stable. Referral called to [facility] for DME. Message left for liaison for [facility] requesting return call to complete referral.    Awaiting response. The Plan for Transition of Care is related to the following treatment goals: home care and DME    The Patient and/or patient representative patient was provided with a choice of provider and agrees   with the discharge plan. [x] Yes [] No    Freedom of choice list was provided with basic dialogue that supports the patient's individualized plan of care/goals, treatment preferences and shares the quality data associated with the providers. [x] Yes [] No               Referral made [facility]. liaison. Awaiting acceptance.    Electronically signed by EDWIGE Cedillo on 11/26/2022 at 1:08 PM

## 2022-11-26 NOTE — PROGRESS NOTES
Daily Treat      Evaluating PT:  Lizy Nova PJaseT. Room #:  0479/4113-W  Diagnosis:  Fall, initial encounter [W19. XXXA]  Closed displaced intertrochanteric fracture of right femur, initial encounter ([facility] [address] [S72.141A]  Intertrochanteric fracture of right femur, closed, initial encounter ([facility] [address] Nate Tan, tripped and fell at home. Pertinent PMHx/PSHx:  tobacco abuse, daily ETOH use  Procedure/Surgery:  11/24/22 R hip ORIF  Precautions:  WBAT RLE, falls, bed/chair alarm  Equipment Needs:  wheeled walker     SUBJECTIVE:     Pt lives alone in a 2 story home with 4 stairs and 1 rail to enter. She has first floor set up. Pt ambulated with no AD PTA. OBJECTIVE:    Initial Evaluation  Date: 11/25/22 Treatment  11/26/22 PM Short Term/ Long Term   Goals   Was pt agreeable to Eval/treatment? yes Yes      Does pt have pain? No c/o pain at rest, c/o severe RLE pain with movement and WB RT Hip      Bed Mobility  Rolling: MOD A  Supine to sit: MAX A  Sit to supine: NA  Scooting: MAX A  Sup-sit: RTLE Min assist, otherwise indep/S. Scoot to EOB: Indep Independent    Transfers Sit to stand: MIN A  Stand to sit: MIN A  Stand pivot: MIN A with ww  Bed-stand: CGA/SBA   Stand-Chair: S/SBA Independent    Ambulation   25 feet with ww MIN A  80' [employer] use @ SBA/S 150 feet with ww Independent    Stair negotiation: ascended and descended NA  TBA 4 steps with 1 rail and cane if needed supervision   AM-PAC 6 Clicks 00/63  22/50        Pt is alert and oriented x3  Balance: Requires WW with SBA/S      Therapeutic Exercises:  NA    Patient education  Pt educated on WBAT, bed mob techniques, transfers,     Patient response to education:   Pt verbalized understanding Pt demonstrated skill Pt requires further education in this area   Y Y Review PRN     ASSESSMENT:    Comments: In bed on arrival with grand Dtr in room assisting with care. In no distress and reports was OOB in chair this AM x 30'.   Agrees to get OOB, walk and get/stay in chair x 1 hr before getting back into bed. Very pleasant and cooperative and engaged; wants to go home ASAP. Will have grand Dtr assisting care once home. Transfer to EOB was with RTLE min assist only, otherwise indep/S. No dizziness expressed during-post any/all positional changes. Sat on EOB few minutes to allow pain recovery and discuss transfer. Transfer to stand was SBA/S to assess if ready to go homer and at what level. No staggered or delayed performances to stand from bed. Amb with [employer] with a slower but steady reciprocal gait cycle. No drifting, lateral veering or LOB with WW use. Improved distance to 80' PM session with some mild inc pain in the hip. Transfer into chair was with S/SBA with good safety skills. Call light and wall phone on bed with grand Dtr remnaining in room and each agree 1HR in chair. Chair alarm in line and ON. Very appreciative for care. Treatment:  Patient practiced and was instructed in the following treatment:    Bed mob  Transfer  Amb  Education    PLAN:    Pt is making + progress toward established Physical Therapy goals. Continue with physical therapy current plan of care. Total Treatment Time  30 minutes     Evaluation Time includes thorough review of current medical information, gathering information on past medical history/social history and prior level of function, completion of standardized testing/informal observation of tasks, assessment of data and education on plan of care and goals.     CPT codes:  [] Low Complexity PT evaluation 22292  [] Moderate Complexity PT evaluation 79080  [] High Complexity PT evaluation 91657  [] PT Re-evaluation 93423  [x] Gait training 79498 ** minutes  [] Manual therapy 32498 ** minutes  [x] Therapeutic activities 72097 ** minutes  [] Therapeutic exercises 86585 ** minutes  [] Neuromuscular reeducation 95731 ** minutes       Foster Goodpasture PT, RRT, CEAS

## 2022-11-26 NOTE — PROGRESS NOTES
Patient in bed upset with bed frame states \" the bottom falls down with my legs  down position\". Comfort measures done switched bed to new bed in room. Patient satisfied . Patient refuses to use PCDs at this time. Hourly rounds continued.

## 2022-11-26 NOTE — PROGRESS NOTES
Educated patient on the importance of Incentive Spirometer, Patient returned demonstration of 1500, tolerated 2000. Cough deep breathing done well. Hourly rounds continued.

## 2022-11-27 VITALS
TEMPERATURE: 99 F | RESPIRATION RATE: 12 BRPM | DIASTOLIC BLOOD PRESSURE: 49 MMHG | WEIGHT: 144 LBS | HEART RATE: 75 BPM | OXYGEN SATURATION: 100 % | SYSTOLIC BLOOD PRESSURE: 154 MMHG

## 2022-11-27 LAB
ANION GAP SERPL CALCULATED.3IONS-SCNC: 9 MMOL/L (ref 7–16)
BASOPHILS ABSOLUTE: 0.02 E9/L (ref 0–0.2)
BASOPHILS RELATIVE PERCENT: 0.2 % (ref 0–2)
BUN BLDV-MCNC: 11 MG/DL (ref 6–23)
CALCIUM SERPL-MCNC: 8.8 MG/DL (ref 8.6–10.2)
CHLORIDE BLD-SCNC: 107 MMOL/L (ref 98–107)
CO2: 21 MMOL/L (ref 22–29)
CREAT SERPL-MCNC: 0.7 MG/DL (ref 0.5–1)
EOSINOPHILS ABSOLUTE: 0.18 E9/L (ref 0.05–0.5)
EOSINOPHILS RELATIVE PERCENT: 2.1 % (ref 0–6)
GFR SERPL CREATININE-BSD FRML MDRD: >60 ML/MIN/1.73
GLUCOSE BLD-MCNC: 101 MG/DL (ref 74–99)
HCT VFR BLD CALC: 29.2 % (ref 34–48)
HEMOGLOBIN: 10.1 G/DL (ref 11.5–15.5)
IMMATURE GRANULOCYTES #: 0.03 E9/L
IMMATURE GRANULOCYTES %: 0.3 % (ref 0–5)
LYMPHOCYTES ABSOLUTE: 2.52 E9/L (ref 1.5–4)
LYMPHOCYTES RELATIVE PERCENT: 29 % (ref 20–42)
MAGNESIUM: 1.9 MG/DL (ref 1.6–2.6)
MCH RBC QN AUTO: 33.2 PG (ref 26–35)
MCHC RBC AUTO-ENTMCNC: 34.6 % (ref 32–34.5)
MCV RBC AUTO: 96.1 FL (ref 80–99.9)
MONOCYTES ABSOLUTE: 0.74 E9/L (ref 0.1–0.95)
MONOCYTES RELATIVE PERCENT: 8.5 % (ref 2–12)
NEUTROPHILS ABSOLUTE: 5.19 E9/L (ref 1.8–7.3)
NEUTROPHILS RELATIVE PERCENT: 59.9 % (ref 43–80)
PDW BLD-RTO: 12.8 FL (ref 11.5–15)
PLATELET # BLD: 197 E9/L (ref 130–450)
PMV BLD AUTO: 9.4 FL (ref 7–12)
POTASSIUM REFLEX MAGNESIUM: 3.5 MMOL/L (ref 3.5–5)
RBC # BLD: 3.04 E12/L (ref 3.5–5.5)
SODIUM BLD-SCNC: 137 MMOL/L (ref 132–146)
WBC # BLD: 8.7 E9/L (ref 4.5–11.5)

## 2022-11-27 PROCEDURE — 2580000003 HC RX 258: Performed by: GENERAL ACUTE CARE HOSPITAL

## 2022-11-27 PROCEDURE — 6370000000 HC RX 637 (ALT 250 FOR IP): Performed by: GENERAL ACUTE CARE HOSPITAL

## 2022-11-27 PROCEDURE — 85025 COMPLETE CBC W/AUTO DIFF WBC: CPT

## 2022-11-27 PROCEDURE — 99239 HOSP IP/OBS DSCHRG MGMT >30: CPT | Performed by: INTERNAL MEDICINE

## 2022-11-27 PROCEDURE — 83735 ASSAY OF MAGNESIUM: CPT

## 2022-11-27 PROCEDURE — 97530 THERAPEUTIC ACTIVITIES: CPT

## 2022-11-27 PROCEDURE — 97116 GAIT TRAINING THERAPY: CPT

## 2022-11-27 PROCEDURE — 80048 BASIC METABOLIC PNL TOTAL CA: CPT

## 2022-11-27 PROCEDURE — 6370000000 HC RX 637 (ALT 250 FOR IP)

## 2022-11-27 PROCEDURE — 36415 COLL VENOUS BLD VENIPUNCTURE: CPT

## 2022-11-27 PROCEDURE — 97110 THERAPEUTIC EXERCISES: CPT

## 2022-11-27 RX ADMIN — POLYETHYLENE GLYCOL 3350 17 G: 17 POWDER, FOR SOLUTION ORAL at 08:08

## 2022-11-27 RX ADMIN — OXYCODONE 10 MG: 5 TABLET ORAL at 11:02

## 2022-11-27 RX ADMIN — ASPIRIN 325 MG: 325 TABLET, COATED ORAL at 08:08

## 2022-11-27 RX ADMIN — OXYCODONE 10 MG: 5 TABLET ORAL at 15:11

## 2022-11-27 RX ADMIN — SODIUM CHLORIDE, PRESERVATIVE FREE 10 ML: 5 INJECTION INTRAVENOUS at 08:10

## 2022-11-27 RX ADMIN — Medication 100 MG: at 08:08

## 2022-11-27 ASSESSMENT — PAIN SCALES - GENERAL
PAINLEVEL_OUTOF10: 2
PAINLEVEL_OUTOF10: 8
PAINLEVEL_OUTOF10: 10

## 2022-11-27 ASSESSMENT — PAIN DESCRIPTION - FREQUENCY: FREQUENCY: CONTINUOUS

## 2022-11-27 ASSESSMENT — PAIN DESCRIPTION - DESCRIPTORS
DESCRIPTORS: DISCOMFORT
DESCRIPTORS: DISCOMFORT

## 2022-11-27 ASSESSMENT — PAIN DESCRIPTION - LOCATION
LOCATION: HIP
LOCATION: HIP

## 2022-11-27 ASSESSMENT — PAIN DESCRIPTION - ORIENTATION: ORIENTATION: LEFT

## 2022-11-27 ASSESSMENT — PAIN DESCRIPTION - PAIN TYPE: TYPE: SURGICAL PAIN

## 2022-11-27 ASSESSMENT — PAIN DESCRIPTION - ONSET: ONSET: ON-GOING

## 2022-11-27 ASSESSMENT — PAIN - FUNCTIONAL ASSESSMENT: PAIN_FUNCTIONAL_ASSESSMENT: PREVENTS OR INTERFERES SOME ACTIVE ACTIVITIES AND ADLS

## 2022-11-27 NOTE — PLAN OF CARE
Problem: Discharge Planning  Goal: Discharge to home or other facility with appropriate resources  Outcome: Progressing     Problem: Skin/Tissue Integrity  Goal: Absence of new skin breakdown  Description: 1. Monitor for areas of redness and/or skin breakdown  2. Assess vascular access sites hourly  3. Every 4-6 hours minimum:  Change oxygen saturation probe site  4. Every 4-6 hours:  If on nasal continuous positive airway pressure, respiratory therapy assess nares and determine need for appliance change or resting period.   11/27/2022 1139 by Elaine Schirmer, RN  Outcome: Progressing  11/26/2022 2202 by Britton Cooley RN  Outcome: Progressing     Problem: Safety - Adult  Goal: Free from fall injury  11/27/2022 1139 by Elaine Schirmer, RN  Outcome: Progressing  11/26/2022 2202 by Britton Cooley RN  Outcome: Progressing     Problem: ABCDS Injury Assessment  Goal: Absence of physical injury  11/27/2022 1139 by Elaine Schirmer, RN  Outcome: Progressing  11/26/2022 2202 by Britton Cooley RN  Outcome: Progressing     Problem: Pain  Goal: Verbalizes/displays adequate comfort level or baseline comfort level  11/27/2022 1139 by Elaine Schirmer, RN  Outcome: Progressing  11/26/2022 2202 by Britton Cooley RN  Outcome: Progressing

## 2022-11-27 NOTE — PROGRESS NOTES
Encourage patient to take pain medication this morning in order to be able to work with physical therapy this morning. Patient stated that she is not having pain and does not want to take anything.

## 2022-11-27 NOTE — PROGRESS NOTES
Daily Treat      Evaluating PT:  Kin Solomon, P.T. Room #:  4107/9832-U  Diagnosis:  Fall, initial encounter [W19. XXXA]  Closed displaced intertrochanteric fracture of right femur, initial encounter ([facility] [address] [S72.141A]  Intertrochanteric fracture of right femur, closed, initial encounter ([facility] [address] Nato Jones, tripped and fell at home. Pertinent PMHx/PSHx:  tobacco abuse, daily ETOH use  Procedure/Surgery:  11/24/22 R hip ORIF  Precautions:  WBAT RLE, falls, bed/chair alarm  Equipment Needs:  wheeled walker     SUBJECTIVE:     Pt lives alone in a 2 story home with 4 stairs and 1 rail to enter. She has first floor set up. Pt ambulated with no AD PTA. OBJECTIVE:    Initial Evaluation  Date: 11/25/22 Treatment  11/27/22 AM Short Term/ Long Term   Goals   Was pt agreeable to Eval/treatment? yes Yes      Does pt have pain? No c/o pain at rest, c/o severe RLE pain with movement and WB RT Hip inc to 8/10 @ walk. Did not take or want to take pain meds this AM until PT session to see what pain would go to and if needs to take the pain meds. Bed Mobility  Rolling: MOD A  Supine to sit: MAX A  Sit to supine: NA  Scooting: MAX A Roll: indep   Sup-sit: RTLE S/Indep   Scoot to EOB: Indep Independent    Transfers Sit to stand: MIN A  Stand to sit: MIN A  Stand pivot: MIN A with ww  Bed-stand: S/Indep   Stand-Chair: S/Indep with good safety skills Independent    Ambulation   25 feet with ww MIN A  100' [employer] use @ S/Indep  150 feet with ww Independent    Stair negotiation: ascended and descended NA  TBA-States BF just built and will be done today ramp to enter home. Steps held 2/2 no need to do and wants to see how steps are done her home setting with [facility] Carval[facility] PT there.   4 steps with 1 rail and cane if needed supervision   AM-PAC 6 Clicks 73/00  50/91, steps NA but predict 22 to 24/24        Pt is alert and oriented x3  Balance: Requires WW with S/Indep levels dynamic       Therapeutic Exercises:  AA hip flex, LAQ and heel raises. (Inst and educated grand Dtr on there-ex home setting, reps and @ 3-5x/day)    Patient education  Pt educated on WBAT, bed mob techniques, transfers, HEP there-x with her and Kojo Lewis,     Patient response to education:   Pt verbalized understanding Pt demonstrated skill Pt requires further education in this area   Y Y N     ASSESSMENT:    Comments 11/27/22 AM: In no distress on arrival and states she is unsure if going home or to rehab. Unsure on what to do and states \"no one has a real plan for me\". We/i had a lengthy discussion with Magalys Henderson and her grand Dtr to discuss the pro's and cons of each direction ([facility] or rehab). Grand Dtr will be staying with her and she will make sure she does everything recommended. Kojo Lewis is an newly discharged ex [employer] and has been really great in assisting with her care and demanding/persuading Magalys Henderson does recommendations/things despite not wanting to do them, so I fully and totally expect excellent care if going to the home setting @ D/C. Home entrance ramp will be complete/built today by early PM by her BF and therefore will not need to do steps to get into home. She states she will need to go up 12 steps to shower but wants and will wait to have [facility] PT assess her actual steps and educate her appropriately on her actual steps. Bed mobility and transfers were pretty much indep with some supervision for safety only. No staggered or delayed mvmt patterns for bed mobility and transfers. No dizziness expressed during-post all positional changes. Amb with [employer] with imp pace from yesterday, Imp step/strides and pain tolerances; 100' walked but wanted to return to room to prevent any inc pain. States did not take the pain meds before PT session or at all this AM; states she does not like them and wants to see how therapy is tolerated and will then decide if needs to take them.  Pain did inc slightly last 10' of walk and she decided at that time pain meds should be taken on a regular schedule to control the pain. Gait pattern was initial step to pattern non-antalgic and was able to transition into reciprocal step through following inst.  There-x was leslee well with ROM RT hip in AA/A flex 80*, knee/ankle WNL. MMT RT hip 3/5 and knee 5/5. Educated grand Dtr with home there-ex and fully understands all of them. Remained in stack chair will waffle cushion and chair alarm in line and On. Both decide that returning home @ D/C would be better choice; RN's aware of decision to go home today. Call light and wall phone on bed to LT and easily reachable. Very appreciative for weekend care. Comments 11/26/22 PM:  In bed on arrival with grand Dtr in room assisting with care. In no distress and reports was OOB in chair this AM x 30'. Agrees to get OOB, walk and get/stay in chair x 1 hr before getting back into bed. Very pleasant and cooperative and engaged; wants to go home ASAP. Will have grand Dtr assisting care once home. Transfer to EOB was with RTLE min assist only, otherwise indep/S. No dizziness expressed during-post any/all positional changes. Sat on EOB few minutes to allow pain recovery and discuss transfer. Transfer to stand was SBA/S to assess if ready to go homer and at what level. No staggered or delayed performances to stand from bed. Amb with [employer] with a slower but steady reciprocal gait cycle. No drifting, lateral veering or LOB with WW use. Improved distance to 80' PM session with some mild inc pain in the hip. Transfer into chair was with S/SBA with good safety skills. Call light and wall phone on bed with grand Dtr remnaining in room and each agree 1HR in chair. Chair alarm in line and ON. Very appreciative for care. Treatment:  Patient practiced and was instructed in the following treatment:    Bed mob  Transfer  Amb  Education    PLAN:    Pt is making + progress toward established Physical Therapy goals.   Continue with physical therapy current plan of care. Total Treatment Time  45 minutes     Evaluation Time includes thorough review of current medical information, gathering information on past medical history/social history and prior level of function, completion of standardized testing/informal observation of tasks, assessment of data and education on plan of care and goals.     CPT codes:  [] Low Complexity PT evaluation 80995  [] Moderate Complexity PT evaluation 44461  [] High Complexity PT evaluation 28855  [] PT Re-evaluation 40430  [x] Gait training 68351 ** minutes  [] Manual therapy 81903 ** minutes  [x] Therapeutic activities 48583 ** minutes  [x] Therapeutic exercises 35776 ** minutes  [] Neuromuscular reeducation 40216 ** minutes       Thersia Tuyetdle PT, RRT, CEAS

## 2022-11-27 NOTE — PROGRESS NOTES
[facility]   Progress Note    Admitting Date and Time: 11/23/2022 11:11 AM  Admit Dx: Fall, initial encounter [W19. XXXA]  Closed displaced intertrochanteric fracture of right femur, initial encounter ([address] [S72.141A]  Intertrochanteric fracture of right femur, closed, initial encounter ([address] [S72.141A]    Subjective:    Patient was admitted with Fall, initial encounter [W19. XXXA]  Closed displaced intertrochanteric fracture of right femur, initial encounter ([address] [S72.141A]  Intertrochanteric fracture of right femur, closed, initial encounter ([address] [place] [address]. Patient denies fever, chills, cp, sob, n/v.     aspirin  325 mg Oral BID    sodium chloride flush  5-40 mL IntraVENous 2 times per day    polyethylene glycol  17 g Oral Daily    atorvastatin  20 mg Oral Nightly    sodium chloride flush  5-40 mL IntraVENous 2 times per day    thiamine  100 mg Oral Daily     sodium chloride flush, 5-40 mL, PRN  sodium chloride, , PRN  ondansetron, 4 mg, Q8H PRN   Or  ondansetron, 4 mg, Q6H PRN  oxyCODONE, 5 mg, Q4H PRN   Or  oxyCODONE, 10 mg, Q4H PRN  morphine, 2 mg, Q2H PRN   Or  morphine, 4 mg, Q2H PRN  sodium chloride, , PRN  acetaminophen, 650 mg, Q6H PRN   Or  acetaminophen, 650 mg, Q6H PRN  sodium chloride, , PRN  sodium chloride, , PRN         Objective:    PHYSICAL EXAM:    Vitals:  /66   Pulse 65   Temp 98.4 °F (36.9 °C) (Oral)   Resp 20   Wt 144 lb (65.3 kg)   SpO2 98%     General:  Appears comfortable. Answers questions appropriately and cooperative with exam  HEENT:  Mucous membranes moist. No erythema, rhinorrhea, or post-nasal drip noted. Neck:  No carotid bruits. Heart:  Rhythm regular at rate of 64  Lungs:  CTA. No wheeze, rales, or rhonchi  Abdomen:  Positive bowel sounds positive. Soft. Non-tender. No guarding, rebound or rigidity. Breast/Rectal/Genitourinary: not pertinent.     Extremities:  Negative for lower extremity edema  Skin:  Warm and dry  Vascular: 2/4 Dorsalis Pedis pulses bilaterally. Neuro:  limited due to pt's status         Recent Labs     11/25/22  0545 11/26/22  0305 11/27/22  0255    135 137   K 4.0 3.8 3.5   * 107 107   CO2 21* 20* 21*   BUN 11 14 11   CREATININE 0.8 0.8 0.7   GLUCOSE 93 104* 101*   CALCIUM 9.2 8.5* 8.8       Recent Labs     11/25/22  0545 11/26/22  0305 11/27/22  0255   WBC 12.5* 9.1 8.7   RBC 3.42* 2.93* 3.04*   HGB 11.3* 9.9* 10.1*   HCT 33.1* 29.2* 29.2*   MCV 96.8 99.7 96.1   MCH 33.0 33.8 33.2   MCHC 34.1 33.9 34.6*   RDW 13.1 13.2 12.8    154 197   MPV 9.8 9.9 9.4       CBC with Differential:    Lab Results   Component Value Date/Time    WBC 8.7 11/27/2022 02:55 AM    RBC 3.04 11/27/2022 02:55 AM    HGB 10.1 11/27/2022 02:55 AM    HCT 29.2 11/27/2022 02:55 AM     11/27/2022 02:55 AM    MCV 96.1 11/27/2022 02:55 AM    MCH 33.2 11/27/2022 02:55 AM    MCHC 34.6 11/27/2022 02:55 AM    RDW 12.8 11/27/2022 02:55 AM    LYMPHOPCT 29.0 11/27/2022 02:55 AM    MONOPCT 8.5 11/27/2022 02:55 AM    BASOPCT 0.2 11/27/2022 02:55 AM    MONOSABS 0.74 11/27/2022 02:55 AM    LYMPHSABS 2.52 11/27/2022 02:55 AM    EOSABS 0.18 11/27/2022 02:55 AM    BASOSABS 0.02 11/27/2022 02:55 AM     BMP:    Lab Results   Component Value Date/Time     11/27/2022 02:55 AM    K 3.5 11/27/2022 02:55 AM     11/27/2022 02:55 AM    CO2 21 11/27/2022 02:55 AM    BUN 11 11/27/2022 02:55 AM    LABALBU 4.3 11/23/2022 12:09 PM    CREATININE 0.7 11/27/2022 02:55 AM    CALCIUM 8.8 11/27/2022 02:55 AM    LABGLOM >60 11/27/2022 02:55 AM    GLUCOSE 101 11/27/2022 02:55 AM     Magnesium:    Lab Results   Component Value Date/Time    MG 1.9 11/27/2022 02:55 AM        Radiology:   Cody Lucio FOR SURGICAL PROCEDURES   Final Result   Intraprocedural fluoroscopic spot images as above. See separate procedure   report for more information. XR CHEST PORTABLE   Final Result   No acute process.          XR HIP 2-3 VW W PELVIS RIGHT   Final Result Mildly displaced intertrochanteric fracture right hip with mild varus   angulation. Assessment:    Principal Problem:    Intertrochanteric fracture of right femur, closed, initial encounter ([address]  Active Problems:    Leukocytosis    Acidosis    Hyperlipidemia    Closed displaced intertrochanteric fracture of right femur (HCC)    Alcohol abuse    Anemia associated with acute blood loss  Resolved Problems:    * No resolved hospital problems. *      Plan:  Right mildly displaced intertrochanteric fx of right hip pt s/p surg. Encourage activity per ortho. Encourage IS  Anemia with acute blood loss component monitor hgb and transfuse prn  Leukocytosis monitor for signs of infection   improving  Acidosis monitor  Alcohol abuse monitor  Hyperlipidemia pt on statin  Elevated bp without dx of htn monitor    Chart reviewed and updated by nursing    Ortho has addressed dvt prophylaxis. (Pt refusing the lovenox)  Discussed with pt. Pt states pain is getting better and that she is moving around. Pt states she is looking into facility. Continue to encourage IS and activity.      Time spent is 35 min    Electronically signed by Fidelina Tolbert DO on 11/27/2022 at 10:17 AM

## 2022-11-27 NOTE — PROGRESS NOTES
Daily Treat      Evaluating PT:  Edmar Sewell, P.T. Room #:  9986/5171-N  Diagnosis:  Fall, initial encounter [W19. XXXA]  Closed displaced intertrochanteric fracture of right femur, initial encounter ([facility] [address] [S72.141A]  Intertrochanteric fracture of right femur, closed, initial encounter ([facility] [address] [place], tripped and fell at home. Pertinent PMHx/PSHx:  tobacco abuse, daily ETOH use  Procedure/Surgery:  11/24/22 R hip ORIF  Precautions:  WBAT RLE, falls, bed/chair alarm  Equipment Needs:  wheeled walker     SUBJECTIVE:     Pt lives alone in a 2 story home with 4 stairs and 1 rail to enter. She has first floor set up. Pt ambulated with no AD PTA. OBJECTIVE:    Initial Evaluation  Date: 11/25/22 Treatment  11/27/22 PM Short Term/ Long Term   Goals   Was pt agreeable to Eval/treatment? yes Yes      Does pt have pain? No c/o pain at rest, c/o severe RLE pain with movement and WB RT Hip inc from AM session with self-limited PM walk 2/2 pain @ 30'     Bed Mobility  Rolling: MOD A  Supine to sit: MAX A  Sit to supine: NA  Scooting: MAX A  NA-was in chair and was indep/S AM session Independent    Transfers Sit to stand: MIN A  Stand to sit: MIN A  Stand pivot: MIN A with ww  Chair-stand @ Indep Independent    Ambulation   25 feet with ww MIN A  30' [employer] use @ S/[employer] pain from AM and in chair all AM so self-limited 30' 2/2 pain 150 feet with ww Independent    Stair negotiation: ascended and descended NA  TBA-States BF just built and will be done today ramp to enter home. Steps held 2/2 no need to do and wants to see how steps are done her home setting with [facility] PT there. 4 steps with 1 rail and cane if needed supervision   AM-PAC 6 Clicks 56/59  60/60, steps NA but predict 22 to 24/24        Pt is alert and oriented x 3  Balance: Requires WW with S/Indep levels dynamic       Therapeutic Exercises:  AA hip flex, LAQ and heel raises.   (Inst and educated grand Dtr on there-ex home setting, reps and @ 3-5x/day)    Patient education  Pt educated on WBAT, bed mob techniques, transfers, HEP there-x with her and Kojo Lewis,     Patient response to education:   Pt verbalized understanding Pt demonstrated skill Pt requires further education in this area   Y Y N     ASSESSMENT:    Comments 11/27/22 PM: In chair on arrival stating has been in the chair since OOB from our AM session; inc pain from the prolonged sitting but willing to walk and participate PM session as best she can. Decided to leave and go home with grand Dtr assistance. Ramp built from  so no steps. Understands the HEP there-ex and Grand Dtr fully understands them all. Transfer to stand was indep/S level w/o any staggered or delayed behaviors. No dizziness expressed during-post all positional changes. Amb with a slower step to pattern with a minimal antalgic mid stance loading RTLE. No drifting, lateral veering, buckle or LOB with [address] walking. Self-limited her distances 2/2 the pain level inc from sitting all AM and wants to try to keep it lower for transfer home possibly today. Decided to hold on there-ex PM session. Remained in chair on exit. Very appreciative for weekend care. Comments 11/27/22 AM: In no distress on arrival and states she is unsure if going home or to rehab. Unsure on what to do and states \"no one has a real plan for me\". We/i had a lengthy discussion with Hermann Quiles and her grand Dtr to discuss the pro's and cons of each direction ([facility] or rehab). Grand Cmr will be staying with her and she will make sure she does everything recommended. Kojo Lewis is an newly discharged ex [employer] and has been really great in assisting with her care and demanding/persuading Hermann Quiles does recommendations/things despite not wanting to do them, so I fully and totally expect excellent care if going to the home setting @ D/C.   Home entrance ramp will be complete/built today by early PM by her BF and therefore will not need to do steps to get into home. She states she will need to go up 12 steps to shower but wants and will wait to have [facility] PT assess her actual steps and educate her appropriately on her actual steps. Bed mobility and transfers were pretty much indep with some supervision for safety only. No staggered or delayed mvmt patterns for bed mobility and transfers. No dizziness expressed during-post all positional changes. Amb with [employer] with imp pace from yesterday, Imp step/strides and pain tolerances; 100' walked but wanted to return to room to prevent any inc pain. States did not take the pain meds before PT session or at all this AM; states she does not like them and wants to see how therapy is tolerated and will then decide if needs to take them. Pain did inc slightly last 10' of walk and she decided at that time pain meds should be taken on a regular schedule to control the pain. Gait pattern was initial step to pattern non-antalgic and was able to transition into reciprocal step through following inst.  There-x was leslee well with ROM RT hip in AA/A flex 80*, knee/ankle WNL. MMT RT hip 3/5 and knee 5/5. Educated grand Dtr with home there-ex and fully understands all of them. Remained in stack chair will waffle cushion and chair alarm in line and On. Both decide that returning home @ D/C would be better choice; RN's aware of decision to go home today. Call light and wall phone on bed to LT and easily reachable. Very appreciative for weekend care. Comments 11/26/22 PM:  In bed on arrival with grand Dtr in room assisting with care. In no distress and reports was OOB in chair this AM x 30'. Agrees to get OOB, walk and get/stay in chair x 1 hr before getting back into bed. Very pleasant and cooperative and engaged; wants to go home ASAP. Will have grand Dtr assisting care once home. Transfer to EOB was with RTLE min assist only, otherwise indep/S. No dizziness expressed during-post any/all positional changes.   Sat on EOB few minutes to allow pain recovery and discuss transfer. Transfer to stand was SBA/S to assess if ready to go homer and at what level. No staggered or delayed performances to stand from bed. Amb with [employer] with a slower but steady reciprocal gait cycle. No drifting, lateral veering or LOB with WW use. Improved distance to 80' PM session with some mild inc pain in the hip. Transfer into chair was with S/SBA with good safety skills. Call light and wall phone on bed with grand Dtr remnaining in room and each agree 1HR in chair. Chair alarm in line and ON. Very appreciative for care. Treatment:  Patient practiced and was instructed in the following treatment:    Bed mob  Transfer  Amb  Education    PLAN:    Pt is making + progress toward established Physical Therapy goals. Continue with physical therapy current plan of care. Total Treatment Time  45 minutes     Evaluation Time includes thorough review of current medical information, gathering information on past medical history/social history and prior level of function, completion of standardized testing/informal observation of tasks, assessment of data and education on plan of care and goals.     CPT codes:  [] Low Complexity PT evaluation 74577  [] Moderate Complexity PT evaluation 46786  [] High Complexity PT evaluation 13043  [] PT Re-evaluation 47789  [x] Gait training 79321 ** minutes  [] Manual therapy 73643 ** minutes  [x] Therapeutic activities 95315 ** minutes  [] Therapeutic exercises 45738 ** minutes  [] Neuromuscular reeducation 16508 ** minutes       Thersia Ruddle PT, RRT, CEAS

## 2022-11-27 NOTE — CARE COORDINATION
Pt for poss d/c today. [facility] DME notified of probable discharge, per george hubbard; have accepted pt; orders on chart. Aware pt may d/c today. Lizbeth Loredo.

## 2022-11-27 NOTE — CARE COORDINATION
Pt has medicare part A only; DME not covered as pt as VA benefits (obviously closed to day. ). Pt is agreeable to be billed private pay for DME (approx $240 per Estil Flemington at [facility] Estil Flemington will call pt for verbal agreement. pt for discharge today, needs equipment to return home. Per Bubba Pippins will deliver equipment to the house  after 5pm today. Pt aware. And agreeable to plan. Daniel Hernandez.

## 2022-11-27 NOTE — PLAN OF CARE
Problem: Discharge Planning  Goal: Discharge to home or other facility with appropriate resources  11/27/2022 1533 by Michael Farooq RN  Outcome: Adequate for Discharge  11/27/2022 1139 by Michael Farooq RN  Outcome: Progressing     Problem: Skin/Tissue Integrity  Goal: Absence of new skin breakdown  Description: 1. Monitor for areas of redness and/or skin breakdown  2. Assess vascular access sites hourly  3. Every 4-6 hours minimum:  Change oxygen saturation probe site  4. Every 4-6 hours:  If on nasal continuous positive airway pressure, respiratory therapy assess nares and determine need for appliance change or resting period.   11/27/2022 1533 by Michael Farooq RN  Outcome: Adequate for Discharge  11/27/2022 1139 by Michael Farooq RN  Outcome: Progressing     Problem: Safety - Adult  Goal: Free from fall injury  11/27/2022 1533 by Michael Farooq RN  Outcome: Adequate for Discharge  11/27/2022 1139 by Michael Farooq RN  Outcome: Progressing     Problem: ABCDS Injury Assessment  Goal: Absence of physical injury  11/27/2022 1533 by Michael Farooq RN  Outcome: Adequate for Discharge  11/27/2022 1139 by Michael Farooq RN  Outcome: Progressing     Problem: Pain  Goal: Verbalizes/displays adequate comfort level or baseline comfort level  11/27/2022 1533 by Michael Farooq RN  Outcome: Adequate for Discharge  11/27/2022 1139 by Michael Farooq RN  Outcome: Progressing

## 2022-12-02 ENCOUNTER — APPOINTMENT (OUTPATIENT)
Dept: ULTRASOUND IMAGING | Age: 72
End: 2022-12-02
Payer: OTHER GOVERNMENT

## 2022-12-02 ENCOUNTER — HOSPITAL ENCOUNTER (EMERGENCY)
Age: 72
Discharge: HOME | End: 2022-12-02
Attending: EMERGENCY MEDICINE
Payer: OTHER GOVERNMENT

## 2022-12-02 VITALS
RESPIRATION RATE: 16 BRPM | SYSTOLIC BLOOD PRESSURE: 165 MMHG | WEIGHT: 140 LBS | HEART RATE: 82 BPM | TEMPERATURE: 98.3 F | OXYGEN SATURATION: 98 % | BODY MASS INDEX: 23.32 KG/M2 | HEIGHT: 65 IN | DIASTOLIC BLOOD PRESSURE: 73 MMHG

## 2022-12-02 DIAGNOSIS — M79.89 SWELLING OF LOWER EXTREMITY: Primary | ICD-10-CM

## 2022-12-02 LAB
ANION GAP SERPL CALCULATED.3IONS-SCNC: 13 MMOL/L (ref 7–16)
APTT: 30.9 SEC (ref 24.5–35.1)
BASOPHILS ABSOLUTE: 0.04 E9/L (ref 0–0.2)
BASOPHILS RELATIVE PERCENT: 0.3 % (ref 0–2)
BUN BLDV-MCNC: 14 MG/DL (ref 6–23)
CALCIUM SERPL-MCNC: 9.5 MG/DL (ref 8.6–10.2)
CHLORIDE BLD-SCNC: 104 MMOL/L (ref 98–107)
CO2: 23 MMOL/L (ref 22–29)
CREAT SERPL-MCNC: 0.8 MG/DL (ref 0.5–1)
EOSINOPHILS ABSOLUTE: 0.17 E9/L (ref 0.05–0.5)
EOSINOPHILS RELATIVE PERCENT: 1.5 % (ref 0–6)
GFR SERPL CREATININE-BSD FRML MDRD: >60 ML/MIN/1.73
GLUCOSE BLD-MCNC: 92 MG/DL (ref 74–99)
HCT VFR BLD CALC: 32.5 % (ref 34–48)
HEMOGLOBIN: 10.9 G/DL (ref 11.5–15.5)
IMMATURE GRANULOCYTES #: 0.07 E9/L
IMMATURE GRANULOCYTES %: 0.6 % (ref 0–5)
LYMPHOCYTES ABSOLUTE: 1.65 E9/L (ref 1.5–4)
LYMPHOCYTES RELATIVE PERCENT: 14.2 % (ref 20–42)
MCH RBC QN AUTO: 32.5 PG (ref 26–35)
MCHC RBC AUTO-ENTMCNC: 33.5 % (ref 32–34.5)
MCV RBC AUTO: 97 FL (ref 80–99.9)
MONOCYTES ABSOLUTE: 0.8 E9/L (ref 0.1–0.95)
MONOCYTES RELATIVE PERCENT: 6.9 % (ref 2–12)
NEUTROPHILS ABSOLUTE: 8.93 E9/L (ref 1.8–7.3)
NEUTROPHILS RELATIVE PERCENT: 76.5 % (ref 43–80)
PDW BLD-RTO: 12.5 FL (ref 11.5–15)
PLATELET # BLD: 319 E9/L (ref 130–450)
PMV BLD AUTO: 9.1 FL (ref 7–12)
POTASSIUM SERPL-SCNC: 4.2 MMOL/L (ref 3.5–5)
PRO-BNP: 113 PG/ML (ref 0–125)
RBC # BLD: 3.35 E12/L (ref 3.5–5.5)
SODIUM BLD-SCNC: 140 MMOL/L (ref 132–146)
TROPONIN, HIGH SENSITIVITY: 7 NG/L (ref 0–9)
WBC # BLD: 11.7 E9/L (ref 4.5–11.5)

## 2022-12-02 PROCEDURE — 83880 ASSAY OF NATRIURETIC PEPTIDE: CPT

## 2022-12-02 PROCEDURE — 80048 BASIC METABOLIC PNL TOTAL CA: CPT

## 2022-12-02 PROCEDURE — 85025 COMPLETE CBC W/AUTO DIFF WBC: CPT

## 2022-12-02 PROCEDURE — 93005 ELECTROCARDIOGRAM TRACING: CPT | Performed by: NURSE PRACTITIONER

## 2022-12-02 PROCEDURE — 84484 ASSAY OF TROPONIN QUANT: CPT

## 2022-12-02 PROCEDURE — 85730 THROMBOPLASTIN TIME PARTIAL: CPT

## 2022-12-02 PROCEDURE — 93971 EXTREMITY STUDY: CPT

## 2022-12-02 PROCEDURE — 99284 EMERGENCY DEPT VISIT MOD MDM: CPT

## 2022-12-02 PROCEDURE — 36415 COLL VENOUS BLD VENIPUNCTURE: CPT

## 2022-12-03 LAB
EKG ATRIAL RATE: 90 BPM
EKG P AXIS: 23 DEGREES
EKG P-R INTERVAL: 114 MS
EKG Q-T INTERVAL: 356 MS
EKG QRS DURATION: 78 MS
EKG QTC CALCULATION (BAZETT): 435 MS
EKG R AXIS: 32 DEGREES
EKG T AXIS: 16 DEGREES
EKG VENTRICULAR RATE: 90 BPM

## 2022-12-03 PROCEDURE — 93010 ELECTROCARDIOGRAM REPORT: CPT | Performed by: INTERNAL MEDICINE

## 2022-12-03 NOTE — ED NOTES
Department of Emergency Medicine  FIRST PROVIDER TRIAGE NOTE             Independent MLP           12/2/22  8:15 PM EST    Date of Encounter: 12/2/22   MRN: [id number]      HPI: Annabella Norris is a 67 y.o. female who presents to the ED for evaluation of multiple complaints. Complains of right leg swelling. States that she had surgery following a right hip fracture on 11/23/2022. Additionally complaining of shortness of breath and chest pain which started today over the last few hours. ROS: Negative for dizziness or syncope. PE: Gen Appearance/Constitutional: alert  CV: regular rate  Pulm: Respirations easy and unlabored    There were no vitals filed for this visit. Past medical history, surgical history, and medications reviewed. Initial Plan of Care: All treatment areas within department are currently occupied. Plan to order/initiate the following while awaiting opening in ED: labs, EKG, and imaging studies. Initiate treatment/testing, proceed to treatment area when bed available for ED Attending/MLP to continue care.     Electronically signed by BOB Monson CNP   DD: 12/2/22          BOB Monson CNP  12/02/22 2016

## 2022-12-03 NOTE — ED NOTES
Surgery for hip fx Thursday, released Sunday. Occupational therapy in today and noticed swelling of the right LE.       Osvaldo Worley RN  12/02/22 0004

## 2022-12-03 NOTE — ED PROVIDER NOTES
HPI:  12/2/22,   Time: 9:57 PM EILEEN Fish is a 67 y.o. female presenting to the ED for evaluation of right lower extremity edema. She had a hip fracture repair just over a week ago. She was discharged from the hospital on Sunday. She has been doing well at home but her leg started to swell over the past couple days. She states it got worse tonight. She has minimal pain in the leg just noticed that it was edematous. She was concerned for a DVT. Denies fevers or chills. Denies difficulty breathing, cough or hemoptysis. ROS:   Pertinent positives and negatives are stated within HPI, all other systems reviewed and are negative.  --------------------------------------------- PAST HISTORY ---------------------------------------------  Past Medical History:  has a past medical history of Hyperlipidemia. Past Surgical History:  has a past surgical history that includes Neck mass excision (Left) and Hip fracture surgery (Right, 11/24/2022). Social History:  reports that she has been smoking cigarettes. She has a 25.00 pack-year smoking history. She does not have any smokeless tobacco history on file. She reports current alcohol use. She reports that she does not use drugs. Family History: family history is not on file. The patients home medications have been reviewed.     Allergies: Codeine    -------------------------------------------------- RESULTS -------------------------------------------------  All laboratory and radiology results have been personally reviewed by myself   LABS:  Results for orders placed or performed during the hospital encounter of 12/02/22   CBC with Auto Differential   Result Value Ref Range    WBC 11.7 (H) 4.5 - 11.5 E9/L    RBC 3.35 (L) 3.50 - 5.50 E12/L    Hemoglobin 10.9 (L) 11.5 - 15.5 g/dL    Hematocrit 32.5 (L) 34.0 - 48.0 %    MCV 97.0 80.0 - 99.9 fL    MCH 32.5 26.0 - 35.0 pg    MCHC 33.5 32.0 - 34.5 %    RDW 12.5 11.5 - 15.0 fL    Platelets 910 587 - 450 E9/L    MPV 9.1 7.0 - 12.0 fL    Neutrophils % 76.5 43.0 - 80.0 %    Immature Granulocytes % 0.6 0.0 - 5.0 %    Lymphocytes % 14.2 (L) 20.0 - 42.0 %    Monocytes % 6.9 2.0 - 12.0 %    Eosinophils % 1.5 0.0 - 6.0 %    Basophils % 0.3 0.0 - 2.0 %    Neutrophils Absolute 8.93 (H) 1.80 - 7.30 E9/L    Immature Granulocytes # 0.07 E9/L    Lymphocytes Absolute 1.65 1.50 - 4.00 E9/L    Monocytes Absolute 0.80 0.10 - 0.95 E9/L    Eosinophils Absolute 0.17 0.05 - 0.50 E9/L    Basophils Absolute 0.04 0.00 - 0.20 E9/L   Troponin   Result Value Ref Range    Troponin, High Sensitivity 7 0 - 9 ng/L   Basic Metabolic Panel   Result Value Ref Range    Sodium 140 132 - 146 mmol/L    Potassium 4.2 3.5 - 5.0 mmol/L    Chloride 104 98 - 107 mmol/L    CO2 23 22 - 29 mmol/L    Anion Gap 13 7 - 16 mmol/L    Glucose 92 74 - 99 mg/dL    BUN 14 6 - 23 mg/dL    Creatinine 0.8 0.5 - 1.0 mg/dL    Est, Glom Filt Rate >60 >=60 mL/min/1.73    Calcium 9.5 8.6 - 10.2 mg/dL   Brain Natriuretic Peptide   Result Value Ref Range    Pro- 0 - 125 pg/mL   APTT   Result Value Ref Range    aPTT 30.9 24.5 - 35.1 sec   EKG 12 Lead   Result Value Ref Range    Ventricular Rate 90 BPM    Atrial Rate 90 BPM    P-R Interval 114 ms    QRS Duration 78 ms    Q-T Interval 356 ms    QTc Calculation (Bazett) 435 ms    P Axis 23 degrees    R Axis 32 degrees    T Axis 16 degrees       RADIOLOGY:  Interpreted by Radiologist.  US DUP LOWER EXTREMITY RIGHT SELMA   Final Result   No evidence of DVT in the right lower extremity.             ------------------------- NURSING NOTES AND VITALS REVIEWED ---------------------------   The nursing notes within the ED encounter and vital signs as below have been reviewed.    BP (!) 184/77   Pulse 93   Temp 98.3 °F (36.8 °C) (Temporal)   Resp 18   Ht 5' 5\" (1.651 m)   Wt 140 lb (63.5 kg)   SpO2 100%   BMI 23.30 kg/m²   Oxygen Saturation Interpretation: Normal      ---------------------------------------------------PHYSICAL EXAM--------------------------------------      Constitutional/General: Alert and oriented x3, well appearing, non toxic in NAD  Head: NC/AT  Eyes: PERRL, EOMI  Nose:  Nares patent. No congestion or discharge noted. Ears:  TM's intact without erythema or perforation. External canal without swelling  Mouth: Oropharynx clear, handling secretions, no trismus  Neck: Supple, full ROM, no meningeal signs  Pulmonary: Lungs Clear to auscultation bilaterally. No rales or rhonchi or wheezes noted. No retractions. Cardiovascular:  Regular rate and rhythm, no murmurs, gallops, or rubs. 2+ symmetric distal pulses   Chest:  No tenderness, deformity or crepitus  Abdomen: Soft, non tender, non distended, normal bowel sounds  Back:  No tenderness to palpation on the cervical or thoracic or lumbar spine  Extremities: Moves all extremities x 4. Warm and well perfused. 2+ pitting edema in the right lower leg. There is a bounding dorsalis pedis pulse. No neurologic deficit. Skin: warm and dry without rash  Neurologic: GCS 15, no focal acute neurological deficit  Psych: Normal Affect      ------------------------------ ED COURSE/MEDICAL DECISION MAKING----------------------  Medications - No data to display         Medical Decision Making:    Patient has what appears to be postoperative edema without evidence of DVT. Discharged home. Mobilize leg, use compression stockings, and keep leg elevated. Patient advised to return to the emergency department should symptoms worsen. Advised to contact primary care physician to secure follow-up appointment within the next 1-2 days. --------------------------------- IMPRESSION AND DISPOSITION ---------------------------------    IMPRESSION  1.  Swelling of lower extremity        DISPOSITION  Disposition: Discharge to home  Patient condition is good        Fredi Deng DO  12/02/22 4981

## 2022-12-08 NOTE — DISCHARGE SUMMARY
[facility] EMERGENCY SERVICE Physician Discharge Summary       Maki Harrison DO  [address] [address]  [phone]    Follow up in 2 week(s)        Activity level: per ortho    Diet: No diet orders on file    Dispo:home    Condition at discharge: fair          Patient ID:  Arpit Cooney  [id number]  67 y.o.  1950    Admit date: 11/23/2022    Discharge date and time:  11/27/22    Admission Diagnoses: Principal Problem:    Intertrochanteric fracture of right femur, closed, initial encounter [facility])  Active Problems:    Leukocytosis    Acidosis    Hyperlipidemia    Closed displaced intertrochanteric fracture of right femur ([facility] [address]    Alcohol abuse    Anemia associated with acute blood loss  Resolved Problems:    * No resolved hospital problems. *      Discharge Diagnoses: Principal Problem:    Intertrochanteric fracture of right femur, closed, initial encounter ([facility] [address]  Active Problems:    Leukocytosis    Acidosis    Hyperlipidemia    Closed displaced intertrochanteric fracture of right femur ([facility])    Alcohol abuse    Anemia associated with acute blood loss  Resolved Problems:    * No resolved hospital problems. *    Right mildly displaced intertrochanteric fx of right hip  Anemia with acute blood loss   Leukocytosis  Acidosis  Alcohol  abuse  Hyperlipidemia  Elevated bp without dx of htn      Consults:  IP CONSULT TO ORTHOPEDIC SURGERY  IP CONSULT TO SOCIAL WORK  IP CONSULT TO HOME CARE NEEDS    Procedures: HIP OPEN REDUCTION INTERNAL FIXATION    Hospital Course: Patient was admitted with Fall, initial encounter [W19. XXXA]  Closed displaced intertrochanteric fracture of right femur, initial encounter ([facility] [address] [S72.141A]  Intertrochanteric fracture of right femur, closed, initial encounter ([facility] [address] Sean Wallace.  Patient is a 67 y.o. female patient of [facility]  with a pertinent PMHx of hyperlipidemia, tobacco abuse, daily alcohol use who presented to the ER from home with granddaughter with chief complaint of loss of consciousness. Patient states that she was at home, which is being renovated, and was walking through the kitchen when she tripped on a board lying on the ground falling and landing on her right hip. She is adamant that she DID NOT lose consciousness. She denies any presyncopal symptoms or any. Of not being fully alert and aware. She did however, tried to stand after her fall and states that the pain was so severe she became lightheaded and nauseous and felt as if she was going to faint but did not. She denies any head trauma during the fall. She denies any pain anywhere other than the right hip. Pain is exacerbated by movement and does not radiate. She is not having any pain in her back or bowel/bladder incontinence. She denies any other acute concerns. ED course: Vitals were remarkable for elevated blood pressure 148/77. Labs were remarkable for leukocytosis of 14.9. CXR was remarkable for normal chest x-ray. X-ray of the right hip demonstrated mildly displaced intertrochanteric fracture with mild varus angulation. EKG was remarkable for normal EKG, normal sinus rhythm. Patient did receive fentanyl 50 mcg x 1 as well as Dilaudid 1 mg x 1 in the emergency department. Additionally, she refused CT of the head in the emergency department as she stated she did not have any head trauma and did not feel that she needed that image. Internal Medicine was consulted to admit the patient for fracture of the right hip    Pt seen and examined by ortho. Pt had procedure as above. Pt had refused lovenox. On day of discharge, pt wanting to go home. Pt denied fevers, chills,n/v. Discharge planning d/w pt. Time given for questions and all questions answered.        Discharge Exam:  Vitals:    11/27/22 0258 11/27/22 0736 11/27/22 1102 11/27/22 1200   BP: (!) 148/71 134/66  (!) 154/49   Pulse: 87 65  75   Resp: 16 20 16 12   Temp: 98 °F (36.7 °C) 98.4 °F (36.9 °C)  99 °F (37.2 °C)   TempSrc: Oral Oral  Oral   SpO2: 97% 98%  100%   Weight:           Skin: warm and dry, no rash or erythema  Pulmonary/Chest: clear to auscultation bilaterally- no wheezes, rales or rhonchi, normal air movement, no respiratory distress  Cardiovascular: rhythm reg at rate of 84  Abdomen: soft, non-tender, non-distended, normal bowel sounds, no masses or organomegaly  Extremities: no cyanosis, no clubbing, and no edema  No intake/output data recorded. No intake/output data recorded. LABS:  No results for input(s): NA, K, CL, CO2, BUN, CREATININE, GLUCOSE, CALCIUM in the last 72 hours. No results for input(s): WBC, RBC, HGB, HCT, MCV, MCH, MCHC, RDW, PLT, MPV in the last 72 hours. No results for input(s): POCGLU in the last 72 hours. Imaging:   FLUORO FOR SURGICAL PROCEDURES   Final Result   Intraprocedural fluoroscopic spot images as above. See separate procedure   report for more information. XR CHEST PORTABLE   Final Result   No acute process. XR HIP 2-3 VW W PELVIS RIGHT   Final Result   Mildly displaced intertrochanteric fracture right hip with mild varus   angulation. Patient Instructions:      Medication List        START taking these medications      aspirin 325 MG EC tablet  Take 1 tablet by mouth 2 times daily     enoxaparin 40 MG/0.4ML  Commonly known as: LOVENOX  Inject 0.4 mLs into the skin daily for 28 days            CONTINUE taking these medications      atorvastatin 20 MG tablet  Commonly known as: LIPITOR     vitamin D 50 MCG (2000 UT) Caps capsule            ASK your doctor about these medications      oxyCODONE 5 MG immediate release tablet  Commonly known as: ROXICODONE  Take 1 tablet by mouth every 4 hours as needed for Pain for up to 7 days. Ask about: Should I take this medication?                Where to Get Your Medications        You can get these medications from any pharmacy    Bring a paper prescription for each of these medications  enoxaparin 40 MG/0.4ML  oxyCODONE 5 MG immediate release tablet       Information about where to get these medications is not yet available    Ask your nurse or doctor about these medications  aspirin 325 MG EC tablet         Total time for discharge is 37 min    Signed:  Electronically signed by Kreg Seip, DO on 12/7/2022 at 7:11 PM

## 2023-02-25 ENCOUNTER — HOSPITAL ENCOUNTER (EMERGENCY)
Age: 73
Discharge: HOME | End: 2023-02-25
Attending: STUDENT IN AN ORGANIZED HEALTH CARE EDUCATION/TRAINING PROGRAM
Payer: OTHER GOVERNMENT

## 2023-02-25 VITALS
HEIGHT: 65 IN | DIASTOLIC BLOOD PRESSURE: 72 MMHG | OXYGEN SATURATION: 98 % | WEIGHT: 135 LBS | RESPIRATION RATE: 14 BRPM | BODY MASS INDEX: 22.49 KG/M2 | SYSTOLIC BLOOD PRESSURE: 180 MMHG | TEMPERATURE: 98.2 F

## 2023-02-25 DIAGNOSIS — K04.7 DENTAL ABSCESS: Primary | ICD-10-CM

## 2023-02-25 PROCEDURE — 6370000000 HC RX 637 (ALT 250 FOR IP): Performed by: STUDENT IN AN ORGANIZED HEALTH CARE EDUCATION/TRAINING PROGRAM

## 2023-02-25 PROCEDURE — 99283 EMERGENCY DEPT VISIT LOW MDM: CPT

## 2023-02-25 RX ORDER — AMOXICILLIN AND CLAVULANATE POTASSIUM 875; 125 MG/1; MG/1
1 TABLET, FILM COATED ORAL 2 TIMES DAILY
Qty: 14 TABLET | Refills: 0 | Status: SHIPPED | OUTPATIENT
Start: 2023-02-25 | End: 2023-03-04

## 2023-02-25 RX ORDER — AMOXICILLIN AND CLAVULANATE POTASSIUM 875; 125 MG/1; MG/1
1 TABLET, FILM COATED ORAL ONCE
Status: COMPLETED | OUTPATIENT
Start: 2023-02-25 | End: 2023-02-25

## 2023-02-25 RX ORDER — IBUPROFEN 800 MG/1
800 TABLET ORAL 2 TIMES DAILY PRN
Qty: 28 TABLET | Refills: 0 | Status: SHIPPED | OUTPATIENT
Start: 2023-02-25 | End: 2023-03-11

## 2023-02-25 RX ORDER — CHLORHEXIDINE GLUCONATE 0.12 MG/ML
15 RINSE ORAL 2 TIMES DAILY
Qty: 420 ML | Refills: 0 | Status: SHIPPED | OUTPATIENT
Start: 2023-02-25 | End: 2023-03-11

## 2023-02-25 RX ORDER — ACETAMINOPHEN 500 MG
1000 TABLET ORAL ONCE
Status: COMPLETED | OUTPATIENT
Start: 2023-02-25 | End: 2023-02-25

## 2023-02-25 RX ADMIN — ACETAMINOPHEN 1000 MG: 500 TABLET ORAL at 11:55

## 2023-02-25 RX ADMIN — AMOXICILLIN AND CLAVULANATE POTASSIUM 1 TABLET: 875; 125 TABLET, FILM COATED ORAL at 11:55

## 2023-02-25 ASSESSMENT — PAIN DESCRIPTION - PAIN TYPE
TYPE: ACUTE PAIN
TYPE: ACUTE PAIN

## 2023-02-25 ASSESSMENT — PAIN DESCRIPTION - ORIENTATION: ORIENTATION: RIGHT;LOWER

## 2023-02-25 ASSESSMENT — PAIN DESCRIPTION - ONSET: ONSET: ON-GOING

## 2023-02-25 ASSESSMENT — PAIN - FUNCTIONAL ASSESSMENT: PAIN_FUNCTIONAL_ASSESSMENT: 0-10

## 2023-02-25 ASSESSMENT — PAIN DESCRIPTION - DESCRIPTORS: DESCRIPTORS: DISCOMFORT

## 2023-02-25 ASSESSMENT — PAIN DESCRIPTION - LOCATION: LOCATION: TEETH

## 2023-02-25 ASSESSMENT — PAIN SCALES - GENERAL
PAINLEVEL_OUTOF10: 8
PAINLEVEL_OUTOF10: 8

## 2023-02-25 ASSESSMENT — PAIN DESCRIPTION - FREQUENCY
FREQUENCY: CONTINUOUS
FREQUENCY: CONTINUOUS

## 2023-02-25 NOTE — ED PROVIDER NOTES
[facility]        Pt Name: Kiah Hood  MRN: [id number]  [place] 1950  Date of evaluation: 2/25/2023  Provider: Nasir Mireles DO  PCP: AURY JHA  Note Started: 11:36 AM EST 2/25/23    CHIEF COMPLAINT       Chief Complaint   Patient presents with    Dental Pain     Right upper. Pain right ear       HISTORY OF PRESENT ILLNESS: 1 or more Elements   History From: patient    Limitations to history : None    Kiah Hood is a 67 y.o. female who presents to the emergency department complaining of right-sided dental pain. The patient states her symptoms are sudden onset about a day and a half ago, has been persistent, moderate severity, nothing makes it better or worse. She not take anything for symptoms prior to arrival.  She states that she thinks that she has an abscess that is radiating up into her right ear. The patient states that she does have some dental caries. She currently does not follow-up with a dentist because she has been insurance and she has not been able to see anyone for this. She is not currently on any antibiotics. She states that she feels like she had a low-grade fever yesterday. She denies any lightheadedness, dizziness, syncope, headache, chest pain, shortness breath, difficulty breathing, difficulty swallowing, recent hospitalization, recent was, or other acute symptoms or concerns. Nursing Notes were all reviewed and agreed with or any disagreements were addressed in the HPI. REVIEW OF SYSTEMS :      Review of Systems    Positives and Pertinent negatives as per HPI.      SURGICAL HISTORY     Past Surgical History:   Procedure Laterality Date    HIP FRACTURE SURGERY Right 11/24/2022    HIP OPEN REDUCTION INTERNAL FIXATION performed by Donna Stark DO at [facility] Left     Around [address]  325 MG EC TABLET    Take 1 tablet by mouth 2 times daily    ATORVASTATIN (LIPITOR) 20 MG TABLET    Take 20 mg by mouth daily    CHOLECALCIFEROL (VITAMIN D) 50 MCG (2000 UT) CAPS CAPSULE    Take by mouth       ALLERGIES     Codeine    FAMILYHISTORY       Family History   Problem Relation Age of Onset    Malig Hypertherm Neg Hx         SOCIAL HISTORY       Social History     Tobacco Use    Smoking status: Every Day     Packs/day: 0.50     Years: 50.00     Pack years: 25.00     Types: Cigarettes   Substance Use Topics    Alcohol use: Yes    Drug use: Never       SCREENINGS        Jessenia Coma Scale  Eye Opening: Spontaneous  Best Verbal Response: Oriented  Best Motor Response: Obeys commands  Mallory Coma Scale Score: 15                CIWA Assessment  BP: (!) 180/72           PHYSICAL EXAM  1 or more Elements     ED Triage Vitals   BP Temp Temp Source Pulse Resp SpO2 Height Weight   02/25/23 1127 02/25/23 1127 02/25/23 1127 -- 02/25/23 1127 02/25/23 1127 02/25/23 1122 02/25/23 1122   (!) 180/72 98.2 °F (36.8 °C) Oral  14 98 % 5' 5\" (1.651 m) 135 lb (61.2 kg)       Physical Exam      Constitutional/General: Alert and oriented x3, patient sitting up in bed in no acute distress  Head: Normocephalic and atraumatic  Eyes: PERRL, EOMI, conjunctiva normal, sclera non icteric  ENT:  Oropharynx clear, handling secretions, no trismus, no asymmetry of the posterior oropharynx or uvular edema. The patient does have evidence of an abscess of the right lower molar region that is not actively draining at this time. There is mild amount of erythema and there is some facial swelling of the right lower jaw region. No cervical lymphadenopathy. No trismus. Handling secretions well. Posterior oropharynx is clear. Neck: Supple, full ROM, no stridor, no meningeal signs  Respiratory: Lungs clear to auscultation bilaterally, no wheezes, rales, or rhonchi. Not in respiratory distress  Cardiovascular:  Regular rate. Regular rhythm.  No murmurs, no gallops, no rubs. 2+ distal pulses. Equal extremity pulses. Chest: No chest wall tenderness  GI:  Abdomen Soft, Non tender, Non distended. +BS. No rebound, guarding, or rigidity. No pulsatile masses. Musculoskeletal: Moves all extremities x 4. Warm and well perfused, no clubbing, no cyanosis, no edema. Capillary refill <3 seconds  Integument: skin warm and dry. No rashes. Neurologic: GCS 15, no focal deficits, symmetric strength 5/5 in the upper and lower extremities bilaterally  Psychiatric: Normal Affect      DIAGNOSTIC RESULTS   LABS:    Labs Reviewed - No data to display    As interpreted by me, the above displayed labs are abnormal. All other labs obtained during this visit were within normal range or not returned as of this dictation. RADIOLOGY:   Non-plain film images such as CT, Ultrasound and MRI are read by the radiologist.     Interpretation per the Radiologist below, if available at the time of this note:    No orders to display     No results found. No results found. PROCEDURES   Unless otherwise noted below, none     Procedures    CRITICAL CARE TIME (.cct)   0    PAST MEDICAL HISTORY/Chronic Conditions Affecting Care      has a past medical history of Hyperlipidemia.      EMERGENCY DEPARTMENT COURSE    Vitals:    Vitals:    02/25/23 1122 02/25/23 1127   BP:  (!) 180/72   Resp:  14   Temp:  98.2 °F (36.8 °C)   TempSrc:  Oral   SpO2:  98%   Weight: 135 lb (61.2 kg) 135 lb (61.2 kg)   Height: 5' 5\" (1.651 m) 5' 5\" (1.651 m)       Patient was given the following medications:  Medications   amoxicillin-clavulanate (AUGMENTIN) 875-125 MG per tablet 1 tablet (has no administration in time range)   acetaminophen (TYLENOL) tablet 1,000 mg (has no administration in time range)               Medical Decision Making/Differential Diagnosis:    CC/HPI Summary, Social Determinants of health, Records Reviewed, DDx, testing done/not done, ED Course, Reassessment, disposition considerations/shared decision making with patient, consults, disposition:        The patient is a 27-year-old female presents emergency department complaining of dental pain. She is hemodynamically stable, nontoxic, no acute distress. She is afebrile. She is not septic. There are no known social determinants of health besides the fact that she does not have a dentist and has not been able to follow with one since she has Bartolo Nogueira she states. Prior records were reviewed and the patient was last seen in the emergency department on December 2 for swelling of her leg. Patient was admitted November 23 for a fall due to a hip fracture. Differential diagnosis includes but not limited to gingivitis versus abscess versus dental caries. Patient was treated with oral Augmentin and oral Tylenol. Patient was provided with prescriptions for Augmentin, Peridex, and ibuprofen. Recommended patient to go to the dental clinic on [facility] and follow-up very closely. Strict return precautions were given and the patient was discharged home in stable condition. CONSULTS: (Who and What was discussed)  None        I am the Primary Clinician of Record. FINAL IMPRESSION      1.  Dental abscess          DISPOSITION/PLAN     DISPOSITION Discharge - Pending Orders Complete 02/25/2023 11:40:47 AM      PATIENT REFERRED TO:  Mendoza Rodriges  [address] [facility] [address] [id number]  [phone]    Schedule an appointment as soon as possible for a visit       [address] [place] [id number] [phone]  Go to       DISCHARGE MEDICATIONS:  New Prescriptions    AMOXICILLIN-CLAVULANATE (AUGMENTIN) 875-125 MG PER TABLET    Take 1 tablet by mouth 2 times daily for 7 days    CHLORHEXIDINE (PERIDEX) 0.12 % SOLUTION    Swish and spit 15 mLs 2 times daily for 14 days    IBUPROFEN (ADVIL;MOTRIN) 800 MG TABLET    Take 1 tablet by mouth 2 times daily as needed for Pain DISCONTINUED MEDICATIONS:  Discontinued Medications    No medications on file              (Please note that portions of this note were completed with a voice recognition program.  Efforts were made to edit the dictations but occasionally words are mis-transcribed.)    Zenaida Burk DO (electronically signed)           Zenaida Burk DO  02/25/23 1149

## 2023-06-27 ENCOUNTER — HOSPITAL ENCOUNTER (OUTPATIENT)
Dept: CT IMAGING | Age: 73
Discharge: HOME | End: 2023-06-29
Attending: GENERAL ACUTE CARE HOSPITAL
Payer: OTHER GOVERNMENT

## 2023-06-27 DIAGNOSIS — M25.551 RIGHT HIP PAIN: ICD-10-CM

## 2023-06-27 DIAGNOSIS — S72.141A CLOSED INTERTROCHANTERIC FRACTURE OF RIGHT FEMUR, INITIAL ENCOUNTER (HCC): ICD-10-CM

## 2023-06-27 PROCEDURE — 73700 CT LOWER EXTREMITY W/O DYE: CPT

## 2023-10-19 NOTE — ANESTHESIA PROCEDURE NOTES
Peripheral Block    Patient location during procedure: procedure area  Reason for block: post-op pain management and at surgeon's request  Start time: 11/24/2022 8:17 AM  End time: 11/24/2022 8:18 AM  Staffing  Performed: anesthesiologist   Anesthesiologist: Augusta Fair, DO  Preanesthetic Checklist  Completed: patient identified, IV checked, site marked, risks and benefits discussed, surgical/procedural consents, equipment checked, pre-op evaluation, timeout performed, anesthesia consent given, oxygen available and monitors applied/VS acknowledged  Peripheral Block   Patient position: supine  Prep: ChloraPrep  Provider prep: sterile gloves and mask  Patient monitoring: continuous pulse ox, cardiac monitor and IV access  Block type: Femoral lateral cutaneous  Laterality: right  Injection technique: single-shot  Guidance: ultrasound guided    Needle   Needle type: combined needle/nerve stimulator   Needle gauge: 22 G  Needle localization: anatomical landmarks and ultrasound guidance  Needle length: 10 cm  Assessment   Injection assessment: negative aspiration for heme, no paresthesia on injection, local visualized surrounding nerve on ultrasound and no intravascular symptoms  Paresthesia pain: none  Slow fractionated injection: yes  Hemodynamics: stable  Real-time US image taken/store: yes  Outcomes: patient tolerated procedure well    Medications Administered  ropivacaine (NAROPIN) injection 0.5% - Perineural   10 mL - 11/24/2022 8:17:00 AM Quality 431: Preventive Care And Screening: Unhealthy Alcohol Use - Screening: Patient not identified as an unhealthy alcohol user when screened for unhealthy alcohol use using a systematic screening method Detail Level: Detailed Quality 226: Preventive Care And Screening: Tobacco Use: Screening And Cessation Intervention: Patient screened for tobacco use and is an ex/non-smoker

## 2024-01-09 RX ORDER — CALCIUM CARBONATE 500(1250)
500 TABLET ORAL DAILY
COMMUNITY

## 2024-01-11 ENCOUNTER — HOSPITAL ENCOUNTER (OUTPATIENT)
Dept: PREADMISSION TESTING | Age: 74
Discharge: HOME | End: 2024-01-11

## 2024-01-11 NOTE — PROGRESS NOTES
Patient was to have EKG done at [facility] today at 11.  Called patient and she stated she was possibly rescheduling her procedure and that she would not make it for her EKG today.  Spoke with Irish at Dr Fuchs's office and Dr Fuchs will be calling patient to discuss possible reschedule.

## 2024-01-15 ENCOUNTER — ANESTHESIA EVENT (OUTPATIENT)
Dept: OPERATING ROOM | Age: 74
DRG: 481 | End: 2024-01-15
Payer: OTHER GOVERNMENT

## 2024-01-15 ENCOUNTER — APPOINTMENT (OUTPATIENT)
Dept: GENERAL RADIOLOGY | Age: 74
DRG: 481 | End: 2024-01-15
Attending: GENERAL ACUTE CARE HOSPITAL
Payer: OTHER GOVERNMENT

## 2024-01-15 ENCOUNTER — ANESTHESIA (OUTPATIENT)
Dept: OPERATING ROOM | Age: 74
DRG: 481 | End: 2024-01-15
Payer: OTHER GOVERNMENT

## 2024-01-15 ENCOUNTER — APPOINTMENT (OUTPATIENT)
Dept: CT IMAGING | Age: 74
DRG: 481 | End: 2024-01-15
Attending: GENERAL ACUTE CARE HOSPITAL
Payer: OTHER GOVERNMENT

## 2024-01-15 ENCOUNTER — HOSPITAL ENCOUNTER (INPATIENT)
Age: 74
LOS: 2 days | Discharge: HOME HEALTH | DRG: 481 | End: 2024-01-17
Attending: GENERAL ACUTE CARE HOSPITAL | Admitting: GENERAL ACUTE CARE HOSPITAL
Payer: OTHER GOVERNMENT

## 2024-01-15 DIAGNOSIS — S72.141K CLOSED DISPLACED INTERTROCHANTERIC FRACTURE OF RIGHT FEMUR WITH NONUNION: ICD-10-CM

## 2024-01-15 PROBLEM — R20.2 PARESTHESIAS: Status: ACTIVE | Noted: 2024-01-15

## 2024-01-15 PROBLEM — Z78.9 ALCOHOL USE: Status: ACTIVE | Noted: 2024-01-15

## 2024-01-15 PROBLEM — Z41.9 SURGERY, ELECTIVE: Status: ACTIVE | Noted: 2024-01-15

## 2024-01-15 PROBLEM — F17.200 TOBACCO USE DISORDER: Status: ACTIVE | Noted: 2024-01-15

## 2024-01-15 LAB
ANION GAP SERPL CALCULATED.3IONS-SCNC: 11 MMOL/L (ref 7–16)
BASOPHILS # BLD: 0.02 K/UL (ref 0–0.2)
BASOPHILS NFR BLD: 0 % (ref 0–2)
BUN SERPL-MCNC: 12 MG/DL (ref 6–23)
CALCIUM SERPL-MCNC: 8.6 MG/DL (ref 8.6–10.2)
CHLORIDE SERPL-SCNC: 106 MMOL/L (ref 98–107)
CO2 SERPL-SCNC: 22 MMOL/L (ref 22–29)
CREAT SERPL-MCNC: 0.8 MG/DL (ref 0.5–1)
EOSINOPHIL # BLD: 0 K/UL (ref 0.05–0.5)
EOSINOPHILS RELATIVE PERCENT: 0 % (ref 0–6)
ERYTHROCYTE [DISTWIDTH] IN BLOOD BY AUTOMATED COUNT: 12.8 % (ref 11.5–15)
FOLATE SERPL-MCNC: >20 NG/ML (ref 4.8–24.2)
GFR SERPL CREATININE-BSD FRML MDRD: >60 ML/MIN/1.73M2
GLUCOSE SERPL-MCNC: 106 MG/DL (ref 74–99)
HCT VFR BLD AUTO: 35.8 % (ref 34–48)
HGB BLD-MCNC: 12.2 G/DL (ref 11.5–15.5)
IMM GRANULOCYTES # BLD AUTO: 0.04 K/UL (ref 0–0.58)
IMM GRANULOCYTES NFR BLD: 0 % (ref 0–5)
LYMPHOCYTES NFR BLD: 0.59 K/UL (ref 1.5–4)
LYMPHOCYTES RELATIVE PERCENT: 5 % (ref 20–42)
MCH RBC QN AUTO: 33 PG (ref 26–35)
MCHC RBC AUTO-ENTMCNC: 34.1 G/DL (ref 32–34.5)
MCV RBC AUTO: 96.8 FL (ref 80–99.9)
MONOCYTES NFR BLD: 0.34 K/UL (ref 0.1–0.95)
MONOCYTES NFR BLD: 3 % (ref 2–12)
NEUTROPHILS NFR BLD: 92 % (ref 43–80)
NEUTS SEG NFR BLD: 11.73 K/UL (ref 1.8–7.3)
PLATELET # BLD AUTO: 221 K/UL (ref 130–450)
PMV BLD AUTO: 9.2 FL (ref 7–12)
POTASSIUM SERPL-SCNC: 4.2 MMOL/L (ref 3.5–5)
RBC # BLD AUTO: 3.7 M/UL (ref 3.5–5.5)
RBC # BLD: NORMAL 10*6/UL
SODIUM SERPL-SCNC: 139 MMOL/L (ref 132–146)
VIT B12 SERPL-MCNC: 309 PG/ML (ref 211–946)
WBC OTHER # BLD: 12.7 K/UL (ref 4.5–11.5)

## 2024-01-15 PROCEDURE — 2720000010 HC SURG SUPPLY STERILE: Performed by: GENERAL ACUTE CARE HOSPITAL

## 2024-01-15 PROCEDURE — 0QP634Z REMOVAL OF INTERNAL FIXATION DEVICE FROM RIGHT UPPER FEMUR, PERCUTANEOUS APPROACH: ICD-10-PCS | Performed by: GENERAL ACUTE CARE HOSPITAL

## 2024-01-15 PROCEDURE — 3700000000 HC ANESTHESIA ATTENDED CARE: Performed by: GENERAL ACUTE CARE HOSPITAL

## 2024-01-15 PROCEDURE — 6360000002 HC RX W HCPCS: Performed by: GENERAL ACUTE CARE HOSPITAL

## 2024-01-15 PROCEDURE — 88300 SURGICAL PATH GROSS: CPT

## 2024-01-15 PROCEDURE — 82607 VITAMIN B-12: CPT

## 2024-01-15 PROCEDURE — 2580000003 HC RX 258: Performed by: GENERAL ACUTE CARE HOSPITAL

## 2024-01-15 PROCEDURE — 87081 CULTURE SCREEN ONLY: CPT

## 2024-01-15 PROCEDURE — 70450 CT HEAD/BRAIN W/O DYE: CPT

## 2024-01-15 PROCEDURE — 3700000001 HC ADD 15 MINUTES (ANESTHESIA): Performed by: GENERAL ACUTE CARE HOSPITAL

## 2024-01-15 PROCEDURE — 80048 BASIC METABOLIC PNL TOTAL CA: CPT

## 2024-01-15 PROCEDURE — 85025 COMPLETE CBC W/AUTO DIFF WBC: CPT

## 2024-01-15 PROCEDURE — 7100000000 HC PACU RECOVERY - FIRST 15 MIN: Performed by: GENERAL ACUTE CARE HOSPITAL

## 2024-01-15 PROCEDURE — C1769 GUIDE WIRE: HCPCS | Performed by: GENERAL ACUTE CARE HOSPITAL

## 2024-01-15 PROCEDURE — C1713 ANCHOR/SCREW BN/BN,TIS/BN: HCPCS | Performed by: GENERAL ACUTE CARE HOSPITAL

## 2024-01-15 PROCEDURE — 93005 ELECTROCARDIOGRAM TRACING: CPT

## 2024-01-15 PROCEDURE — 3600000014 HC SURGERY LEVEL 4 ADDTL 15MIN: Performed by: GENERAL ACUTE CARE HOSPITAL

## 2024-01-15 PROCEDURE — 6360000002 HC RX W HCPCS: Performed by: ANESTHESIOLOGY

## 2024-01-15 PROCEDURE — 2500000003 HC RX 250 WO HCPCS: Performed by: GENERAL ACUTE CARE HOSPITAL

## 2024-01-15 PROCEDURE — 2500000003 HC RX 250 WO HCPCS: Performed by: NURSE ANESTHETIST, CERTIFIED REGISTERED

## 2024-01-15 PROCEDURE — 7100000001 HC PACU RECOVERY - ADDTL 15 MIN: Performed by: GENERAL ACUTE CARE HOSPITAL

## 2024-01-15 PROCEDURE — 6360000002 HC RX W HCPCS: Performed by: NURSE ANESTHETIST, CERTIFIED REGISTERED

## 2024-01-15 PROCEDURE — 99222 1ST HOSP IP/OBS MODERATE 55: CPT | Performed by: STUDENT IN AN ORGANIZED HEALTH CARE EDUCATION/TRAINING PROGRAM

## 2024-01-15 PROCEDURE — 6370000000 HC RX 637 (ALT 250 FOR IP): Performed by: GENERAL ACUTE CARE HOSPITAL

## 2024-01-15 PROCEDURE — 36415 COLL VENOUS BLD VENIPUNCTURE: CPT

## 2024-01-15 PROCEDURE — 3600000004 HC SURGERY LEVEL 4 BASE: Performed by: GENERAL ACUTE CARE HOSPITAL

## 2024-01-15 PROCEDURE — 2709999900 HC NON-CHARGEABLE SUPPLY: Performed by: GENERAL ACUTE CARE HOSPITAL

## 2024-01-15 PROCEDURE — 1200000000 HC SEMI PRIVATE

## 2024-01-15 PROCEDURE — 2580000003 HC RX 258: Performed by: NURSE ANESTHETIST, CERTIFIED REGISTERED

## 2024-01-15 PROCEDURE — 0QS604Z REPOSITION RIGHT UPPER FEMUR WITH INTERNAL FIXATION DEVICE, OPEN APPROACH: ICD-10-PCS | Performed by: GENERAL ACUTE CARE HOSPITAL

## 2024-01-15 PROCEDURE — 82746 ASSAY OF FOLIC ACID SERUM: CPT

## 2024-01-15 DEVICE — SCREW BNE L42MM DIA5MM TIB LT GRN TI ST CANN LOK FULL THRD: Type: IMPLANTABLE DEVICE | Site: HIP | Status: FUNCTIONAL

## 2024-01-15 DEVICE — 11MM/125 DEG TI CANN TFNA 380MM/RIGHT - STERILE
Type: IMPLANTABLE DEVICE | Site: HIP | Status: FUNCTIONAL
Brand: TFN-ADVANCE

## 2024-01-15 DEVICE — TFNA FENESTRATED SCREW 90MM - STERILE
Type: IMPLANTABLE DEVICE | Site: HIP | Status: FUNCTIONAL
Brand: TFN-ADVANCE

## 2024-01-15 RX ORDER — FENTANYL CITRATE 50 UG/ML
25 INJECTION, SOLUTION INTRAMUSCULAR; INTRAVENOUS EVERY 5 MIN PRN
Status: DISCONTINUED | OUTPATIENT
Start: 2024-01-15 | End: 2024-01-15 | Stop reason: HOSPADM

## 2024-01-15 RX ORDER — PROCHLORPERAZINE EDISYLATE 5 MG/ML
5 INJECTION INTRAMUSCULAR; INTRAVENOUS
Status: DISCONTINUED | OUTPATIENT
Start: 2024-01-15 | End: 2024-01-15 | Stop reason: HOSPADM

## 2024-01-15 RX ORDER — SODIUM CHLORIDE 0.9 % (FLUSH) 0.9 %
5-40 SYRINGE (ML) INJECTION PRN
Status: DISCONTINUED | OUTPATIENT
Start: 2024-01-15 | End: 2024-01-17 | Stop reason: HOSPADM

## 2024-01-15 RX ORDER — ONDANSETRON 2 MG/ML
4 INJECTION INTRAMUSCULAR; INTRAVENOUS EVERY 6 HOURS PRN
Status: DISCONTINUED | OUTPATIENT
Start: 2024-01-15 | End: 2024-01-15 | Stop reason: SDUPTHER

## 2024-01-15 RX ORDER — POTASSIUM CHLORIDE 7.45 MG/ML
10 INJECTION INTRAVENOUS PRN
Status: DISCONTINUED | OUTPATIENT
Start: 2024-01-15 | End: 2024-01-17 | Stop reason: HOSPADM

## 2024-01-15 RX ORDER — SODIUM CHLORIDE 9 MG/ML
INJECTION, SOLUTION INTRAVENOUS PRN
Status: DISCONTINUED | OUTPATIENT
Start: 2024-01-15 | End: 2024-01-17 | Stop reason: HOSPADM

## 2024-01-15 RX ORDER — MORPHINE SULFATE 2 MG/ML
2 INJECTION, SOLUTION INTRAMUSCULAR; INTRAVENOUS
Status: DISCONTINUED | OUTPATIENT
Start: 2024-01-15 | End: 2024-01-17 | Stop reason: HOSPADM

## 2024-01-15 RX ORDER — ASPIRIN 325 MG
325 TABLET, DELAYED RELEASE (ENTERIC COATED) ORAL 2 TIMES DAILY
Status: DISCONTINUED | OUTPATIENT
Start: 2024-01-15 | End: 2024-01-17 | Stop reason: HOSPADM

## 2024-01-15 RX ORDER — SODIUM CHLORIDE 9 MG/ML
INJECTION, SOLUTION INTRAVENOUS CONTINUOUS PRN
Status: DISCONTINUED | OUTPATIENT
Start: 2024-01-15 | End: 2024-01-15 | Stop reason: SDUPTHER

## 2024-01-15 RX ORDER — TRAMADOL HYDROCHLORIDE 50 MG/1
50 TABLET ORAL EVERY 6 HOURS PRN
Status: DISCONTINUED | OUTPATIENT
Start: 2024-01-15 | End: 2024-01-17 | Stop reason: HOSPADM

## 2024-01-15 RX ORDER — OXYCODONE HYDROCHLORIDE 5 MG/1
5 TABLET ORAL EVERY 4 HOURS PRN
Status: DISCONTINUED | OUTPATIENT
Start: 2024-01-15 | End: 2024-01-17 | Stop reason: HOSPADM

## 2024-01-15 RX ORDER — CALCIUM CARBONATE 500(1250)
500 TABLET ORAL DAILY
Status: DISCONTINUED | OUTPATIENT
Start: 2024-01-15 | End: 2024-01-17 | Stop reason: HOSPADM

## 2024-01-15 RX ORDER — ATORVASTATIN CALCIUM 20 MG/1
20 TABLET, FILM COATED ORAL DAILY
Status: DISCONTINUED | OUTPATIENT
Start: 2024-01-15 | End: 2024-01-17 | Stop reason: HOSPADM

## 2024-01-15 RX ORDER — POLYETHYLENE GLYCOL 3350 17 G/17G
17 POWDER, FOR SOLUTION ORAL DAILY
Status: DISCONTINUED | OUTPATIENT
Start: 2024-01-15 | End: 2024-01-17 | Stop reason: HOSPADM

## 2024-01-15 RX ORDER — FENTANYL CITRATE 50 UG/ML
INJECTION, SOLUTION INTRAMUSCULAR; INTRAVENOUS PRN
Status: DISCONTINUED | OUTPATIENT
Start: 2024-01-15 | End: 2024-01-15 | Stop reason: SDUPTHER

## 2024-01-15 RX ORDER — MORPHINE SULFATE 4 MG/ML
4 INJECTION, SOLUTION INTRAMUSCULAR; INTRAVENOUS
Status: DISCONTINUED | OUTPATIENT
Start: 2024-01-15 | End: 2024-01-17 | Stop reason: HOSPADM

## 2024-01-15 RX ORDER — SODIUM CHLORIDE 0.9 % (FLUSH) 0.9 %
5-40 SYRINGE (ML) INJECTION EVERY 12 HOURS SCHEDULED
Status: DISCONTINUED | OUTPATIENT
Start: 2024-01-15 | End: 2024-01-17 | Stop reason: HOSPADM

## 2024-01-15 RX ORDER — POLYETHYLENE GLYCOL 3350 17 G/17G
17 POWDER, FOR SOLUTION ORAL DAILY PRN
Status: DISCONTINUED | OUTPATIENT
Start: 2024-01-15 | End: 2024-01-17 | Stop reason: HOSPADM

## 2024-01-15 RX ORDER — POTASSIUM CHLORIDE 20 MEQ/1
40 TABLET, EXTENDED RELEASE ORAL PRN
Status: DISCONTINUED | OUTPATIENT
Start: 2024-01-15 | End: 2024-01-17 | Stop reason: HOSPADM

## 2024-01-15 RX ORDER — SODIUM CHLORIDE 9 MG/ML
INJECTION, SOLUTION INTRAVENOUS PRN
Status: DISCONTINUED | OUTPATIENT
Start: 2024-01-15 | End: 2024-01-15 | Stop reason: HOSPADM

## 2024-01-15 RX ORDER — ONDANSETRON 4 MG/1
4 TABLET, ORALLY DISINTEGRATING ORAL EVERY 8 HOURS PRN
Status: DISCONTINUED | OUTPATIENT
Start: 2024-01-15 | End: 2024-01-17 | Stop reason: HOSPADM

## 2024-01-15 RX ORDER — MAGNESIUM SULFATE IN WATER 40 MG/ML
2000 INJECTION, SOLUTION INTRAVENOUS PRN
Status: DISCONTINUED | OUTPATIENT
Start: 2024-01-15 | End: 2024-01-17 | Stop reason: HOSPADM

## 2024-01-15 RX ORDER — OXYCODONE HYDROCHLORIDE 5 MG/1
10 TABLET ORAL EVERY 4 HOURS PRN
Status: DISCONTINUED | OUTPATIENT
Start: 2024-01-15 | End: 2024-01-17 | Stop reason: HOSPADM

## 2024-01-15 RX ORDER — ONDANSETRON 2 MG/ML
4 INJECTION INTRAMUSCULAR; INTRAVENOUS EVERY 6 HOURS PRN
Status: DISCONTINUED | OUTPATIENT
Start: 2024-01-15 | End: 2024-01-17 | Stop reason: HOSPADM

## 2024-01-15 RX ORDER — NEOSTIGMINE METHYLSULFATE 1 MG/ML
INJECTION, SOLUTION INTRAVENOUS PRN
Status: DISCONTINUED | OUTPATIENT
Start: 2024-01-15 | End: 2024-01-15 | Stop reason: SDUPTHER

## 2024-01-15 RX ORDER — BUPIVACAINE HYDROCHLORIDE 5 MG/ML
INJECTION, SOLUTION EPIDURAL; INTRACAUDAL PRN
Status: DISCONTINUED | OUTPATIENT
Start: 2024-01-15 | End: 2024-01-15 | Stop reason: ALTCHOICE

## 2024-01-15 RX ORDER — PROPOFOL 10 MG/ML
INJECTION, EMULSION INTRAVENOUS PRN
Status: DISCONTINUED | OUTPATIENT
Start: 2024-01-15 | End: 2024-01-15 | Stop reason: SDUPTHER

## 2024-01-15 RX ORDER — SODIUM CHLORIDE 0.9 % (FLUSH) 0.9 %
5-40 SYRINGE (ML) INJECTION EVERY 12 HOURS SCHEDULED
Status: DISCONTINUED | OUTPATIENT
Start: 2024-01-15 | End: 2024-01-15 | Stop reason: HOSPADM

## 2024-01-15 RX ORDER — ASPIRIN 325 MG
325 TABLET, DELAYED RELEASE (ENTERIC COATED) ORAL 2 TIMES DAILY
Status: DISCONTINUED | OUTPATIENT
Start: 2024-01-15 | End: 2024-01-15 | Stop reason: SDUPTHER

## 2024-01-15 RX ORDER — GLYCOPYRROLATE 0.2 MG/ML
INJECTION INTRAMUSCULAR; INTRAVENOUS PRN
Status: DISCONTINUED | OUTPATIENT
Start: 2024-01-15 | End: 2024-01-15 | Stop reason: SDUPTHER

## 2024-01-15 RX ORDER — PHENYLEPHRINE HCL IN 0.9% NACL 1 MG/10 ML
SYRINGE (ML) INTRAVENOUS PRN
Status: DISCONTINUED | OUTPATIENT
Start: 2024-01-15 | End: 2024-01-15 | Stop reason: SDUPTHER

## 2024-01-15 RX ORDER — MIDAZOLAM HYDROCHLORIDE 1 MG/ML
INJECTION INTRAMUSCULAR; INTRAVENOUS PRN
Status: DISCONTINUED | OUTPATIENT
Start: 2024-01-15 | End: 2024-01-15 | Stop reason: SDUPTHER

## 2024-01-15 RX ORDER — DEXAMETHASONE SODIUM PHOSPHATE 4 MG/ML
INJECTION, SOLUTION INTRA-ARTICULAR; INTRALESIONAL; INTRAMUSCULAR; INTRAVENOUS; SOFT TISSUE PRN
Status: DISCONTINUED | OUTPATIENT
Start: 2024-01-15 | End: 2024-01-15 | Stop reason: SDUPTHER

## 2024-01-15 RX ORDER — ONDANSETRON 4 MG/1
4 TABLET, ORALLY DISINTEGRATING ORAL EVERY 8 HOURS PRN
Status: DISCONTINUED | OUTPATIENT
Start: 2024-01-15 | End: 2024-01-15 | Stop reason: SDUPTHER

## 2024-01-15 RX ORDER — ROCURONIUM BROMIDE 10 MG/ML
INJECTION, SOLUTION INTRAVENOUS PRN
Status: DISCONTINUED | OUTPATIENT
Start: 2024-01-15 | End: 2024-01-15 | Stop reason: SDUPTHER

## 2024-01-15 RX ORDER — ONDANSETRON 2 MG/ML
INJECTION INTRAMUSCULAR; INTRAVENOUS PRN
Status: DISCONTINUED | OUTPATIENT
Start: 2024-01-15 | End: 2024-01-15 | Stop reason: SDUPTHER

## 2024-01-15 RX ORDER — SODIUM CHLORIDE 0.9 % (FLUSH) 0.9 %
5-40 SYRINGE (ML) INJECTION PRN
Status: DISCONTINUED | OUTPATIENT
Start: 2024-01-15 | End: 2024-01-15 | Stop reason: HOSPADM

## 2024-01-15 RX ADMIN — GLYCOPYRROLATE 0.6 MG: 0.2 INJECTION INTRAMUSCULAR; INTRAVENOUS at 09:36

## 2024-01-15 RX ADMIN — Medication 100 MCG: at 07:38

## 2024-01-15 RX ADMIN — OXYCODONE 10 MG: 5 TABLET ORAL at 17:27

## 2024-01-15 RX ADMIN — CALCIUM 500 MG: 500 TABLET ORAL at 17:26

## 2024-01-15 RX ADMIN — Medication 100 MCG: at 07:52

## 2024-01-15 RX ADMIN — GLYCOPYRROLATE 0.3 MG: 0.2 INJECTION INTRAMUSCULAR; INTRAVENOUS at 07:52

## 2024-01-15 RX ADMIN — MIDAZOLAM 2 MG: 1 INJECTION INTRAMUSCULAR; INTRAVENOUS at 10:00

## 2024-01-15 RX ADMIN — Medication 50 MCG: at 08:19

## 2024-01-15 RX ADMIN — DEXAMETHASONE SODIUM PHOSPHATE 10 MG: 4 INJECTION, SOLUTION INTRAMUSCULAR; INTRAVENOUS at 07:27

## 2024-01-15 RX ADMIN — FENTANYL CITRATE 100 MCG: 50 INJECTION, SOLUTION INTRAMUSCULAR; INTRAVENOUS at 07:44

## 2024-01-15 RX ADMIN — HYDROMORPHONE HYDROCHLORIDE 0.5 MG: 1 INJECTION, SOLUTION INTRAMUSCULAR; INTRAVENOUS; SUBCUTANEOUS at 10:38

## 2024-01-15 RX ADMIN — ATORVASTATIN CALCIUM 20 MG: 20 TABLET, FILM COATED ORAL at 17:26

## 2024-01-15 RX ADMIN — Medication 50 MCG: at 08:11

## 2024-01-15 RX ADMIN — HYDROMORPHONE HYDROCHLORIDE 0.5 MG: 1 INJECTION, SOLUTION INTRAMUSCULAR; INTRAVENOUS; SUBCUTANEOUS at 10:27

## 2024-01-15 RX ADMIN — Medication 50 MCG: at 09:16

## 2024-01-15 RX ADMIN — ASPIRIN 325 MG: 325 TABLET, COATED ORAL at 18:51

## 2024-01-15 RX ADMIN — FENTANYL CITRATE 100 MCG: 50 INJECTION, SOLUTION INTRAMUSCULAR; INTRAVENOUS at 07:11

## 2024-01-15 RX ADMIN — ONDANSETRON 4 MG: 2 INJECTION INTRAMUSCULAR; INTRAVENOUS at 07:27

## 2024-01-15 RX ADMIN — WATER 2000 MG: 1 INJECTION INTRAMUSCULAR; INTRAVENOUS; SUBCUTANEOUS at 17:26

## 2024-01-15 RX ADMIN — TRANEXAMIC ACID 1000 MG: 100 INJECTION, SOLUTION INTRAVENOUS at 07:27

## 2024-01-15 RX ADMIN — ROCURONIUM BROMIDE 50 MG: 10 INJECTION, SOLUTION INTRAVENOUS at 07:21

## 2024-01-15 RX ADMIN — SODIUM CHLORIDE: 9 INJECTION, SOLUTION INTRAVENOUS at 07:11

## 2024-01-15 RX ADMIN — Medication 3 MG: at 09:36

## 2024-01-15 RX ADMIN — PROPOFOL 200 MG: 10 INJECTION, EMULSION INTRAVENOUS at 07:21

## 2024-01-15 RX ADMIN — FENTANYL CITRATE 100 MCG: 50 INJECTION, SOLUTION INTRAMUSCULAR; INTRAVENOUS at 10:00

## 2024-01-15 RX ADMIN — WATER 2000 MG: 1 INJECTION INTRAMUSCULAR; INTRAVENOUS; SUBCUTANEOUS at 07:11

## 2024-01-15 ASSESSMENT — PAIN DESCRIPTION - DESCRIPTORS
DESCRIPTORS: ACHING;DISCOMFORT;SORE
DESCRIPTORS: ACHING;DISCOMFORT;SHARP
DESCRIPTORS: ACHING;NUMBNESS;BURNING
DESCRIPTORS: ACHING;SHARP;SORE
DESCRIPTORS: DISCOMFORT;SORE

## 2024-01-15 ASSESSMENT — PAIN DESCRIPTION - LOCATION
LOCATION: HIP

## 2024-01-15 ASSESSMENT — PAIN DESCRIPTION - ORIENTATION
ORIENTATION: RIGHT

## 2024-01-15 ASSESSMENT — PAIN SCALES - GENERAL
PAINLEVEL_OUTOF10: 9
PAINLEVEL_OUTOF10: 8
PAINLEVEL_OUTOF10: 6
PAINLEVEL_OUTOF10: 8

## 2024-01-15 ASSESSMENT — PAIN - FUNCTIONAL ASSESSMENT: PAIN_FUNCTIONAL_ASSESSMENT: 0-10

## 2024-01-15 ASSESSMENT — PAIN DESCRIPTION - PAIN TYPE
TYPE: SURGICAL PAIN

## 2024-01-15 ASSESSMENT — LIFESTYLE VARIABLES: SMOKING_STATUS: 1

## 2024-01-15 NOTE — CONSULTS
[facility]  Hospitalist Group   Consult for Medical Management      Reason for Consult:  Medical management    History of Present Illness:  73 y.o. female with a history of HLD  presented to [facility] for  hardware removal with revision open reduction internal fixation of the right hip.1/15/23 S/P  Right Hip Hardware removal open reduction internal fixation per Dr. Fuchs. She has developed numbness to Bilateral feet and difficulty speaking. We have been consulted for further medical treatment.       Informant(s) for H&P: Pt, family, and EMR    REVIEW OF SYSTEMS:  Complete ROS performed with patient, pertinent positives and negatives are listed in the HPI.    PMH:  Past Medical History:   Diagnosis Date    Hyperlipidemia     Prolonged emergence from general anesthesia     Right hip pain        Surgical History:  Past Surgical History:   Procedure Laterality Date    HIP FRACTURE SURGERY Right 11/24/2022    HIP OPEN REDUCTION INTERNAL FIXATION performed by Gonzalo Fuchs DO at [facility] OR    HIP FRACTURE SURGERY Right 1/15/2024    RIGHT HIP HARDWARE REMOVAL OPEN REDUCTION INTERNAL FIXATION OF NON-UNION performed by Gonzalo Fuchs DO at [facility] OR    NECK MASS EXCISION Left     Around 1972       Medications Prior to Admission:    Prior to Admission medications    Medication Sig Start Date End Date Taking? Authorizing Provider   calcium carbonate (OSCAL) 500 MG TABS tablet Take 1 tablet by mouth daily   Yes ProviderGénesis MD   ibuprofen (ADVIL;MOTRIN) 800 MG tablet Take 1 tablet by mouth 2 times daily as needed for Pain  Patient not taking: Reported on 1/15/2024 2/25/23 1/15/24  Aracelis Aragon DO   aspirin 325 MG EC tablet Take 1 tablet by mouth 2 times daily  Patient not taking: Reported on 1/9/2024 11/27/22   Chacho Wolf MD   atorvastatin (LIPITOR) 20 MG tablet Take 1 tablet by mouth daily    ProviderGénesis MD   Cholecalciferol (VITAMIN D) 50 MCG (2000 UT) CAPS capsule

## 2024-01-15 NOTE — ANESTHESIA POSTPROCEDURE EVALUATION
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 [S72.659K])    Surgeons: Gonzalo Fuchs DO Responsible Provider: Elva Helton DO    Anesthesia Type: General ASA Status: 2            Anesthesia Type: General    Bibiana Phase I: Bibiana Score: 10    Bibiana Phase II:      Anesthesia Post Evaluation    Patient location during evaluation: PACU  Patient participation: complete - patient participated  Level of consciousness: awake and alert  Airway patency: patent  Nausea & Vomiting: no nausea and no vomiting  Cardiovascular status: hemodynamically stable  Respiratory status: acceptable  Hydration status: euvolemic  Pain management: adequate    No notable events documented.

## 2024-01-15 NOTE — ANESTHESIA PRE PROCEDURE
12/02/2022 09:04 PM     12/02/2022 09:04 PM       CMP:   Lab Results   Component Value Date/Time     12/02/2022 09:04 PM    K 4.2 12/02/2022 09:04 PM    K 3.5 11/27/2022 02:55 AM     12/02/2022 09:04 PM    CO2 23 12/02/2022 09:04 PM    BUN 14 12/02/2022 09:04 PM    CREATININE 0.8 12/02/2022 09:04 PM    LABGLOM >60 12/02/2022 09:04 PM    GLUCOSE 92 12/02/2022 09:04 PM    PROT 7.0 11/23/2022 12:09 PM    CALCIUM 9.5 12/02/2022 09:04 PM    BILITOT 0.3 11/23/2022 12:09 PM    ALKPHOS 54 11/23/2022 12:09 PM    AST 26 11/23/2022 12:09 PM    ALT 17 11/23/2022 12:09 PM       POC Tests: No results for input(s): \"POCGLU\", \"POCNA\", \"POCK\", \"POCCL\", \"POCBUN\", \"POCHEMO\", \"POCHCT\" in the last 72 hours.    Coags:   Lab Results   Component Value Date/Time    PROTIME 11.1 11/23/2022 12:09 PM    INR 1.0 11/23/2022 12:09 PM    APTT 30.9 12/02/2022 09:04 PM       HCG (If Applicable): No results found for: \"PREGTESTUR\", \"PREGSERUM\", \"HCG\", \"HCGQUANT\"     ABGs: No results found for: \"PHART\", \"PO2ART\", \"UJO8VGJ\", \"HAV5HTW\", \"BEART\", \"W1BFNJXX\"     Type & Screen (If Applicable):  No results found for: \"LABABO\", \"LABRH\"    Drug/Infectious Status (If Applicable):  No results found for: \"HIV\", \"HEPCAB\"    COVID-19 Screening (If Applicable): No results found for: \"COVID19\"      Narrative   EXAMINATION:   ONE XRAY VIEW OF THE PELVIS AND TWO XRAY VIEWS RIGHT HIP       11/23/2022 11:53 am       COMPARISON:   None.       HISTORY:   ORDERING SYSTEM PROVIDED HISTORY: fall, shortening rotation   TECHNOLOGIST PROVIDED HISTORY:   Reason for exam:->fall, shortening rotation       FINDINGS:   There is greater trochanteric fracture seen right hip with mild varus   angulation.  Lesser tuberosity is intact.               Anesthesia Evaluation  Patient summary reviewed and Nursing notes reviewed   no history of anesthetic complications:   Airway: Mallampati: III  TM distance: >3 FB   Neck ROM: full  Mouth opening: > = 3 FB   Dental:

## 2024-01-15 NOTE — ACP (ADVANCE CARE PLANNING)
Advance Care Planning   Healthcare Decision Maker:    Primary Decision Maker: Whitney Hobson - David - [phone]    Click here to complete Healthcare Decision Makers including selection of the Healthcare Decision Maker Relationship (ie \"Primary\").

## 2024-01-15 NOTE — CARE COORDINATION
Met with patient and granddaughter, Whitney about diagnosis and discharge plan of care. Post op right hip hardware removal/ORIF. Previous surgery in 2022. Pt lives alone in 2 story home, bed and bath on 1st floor. Pt has wheeled walker, toilet riser, tub bath and walk in shower in basement. Follows at [facility] with Dr Moran. Plan is home. Granddaughter to help. Referral called to [facility] per pt choice, accepted and orders completed. Will follow-o

## 2024-01-15 NOTE — H&P
12/02/2022 09:04 PM    RBC 3.35 12/02/2022 09:04 PM    HGB 10.9 12/02/2022 09:04 PM    HCT 32.5 12/02/2022 09:04 PM    MCV 97.0 12/02/2022 09:04 PM    MCH 32.5 12/02/2022 09:04 PM    MCHC 33.5 12/02/2022 09:04 PM    RDW 12.5 12/02/2022 09:04 PM     12/02/2022 09:04 PM    MPV 9.1 12/02/2022 09:04 PM         ASSESSMENT:      Patient Active Problem List   Diagnosis    Intertrochanteric fracture of right femur, closed, initial encounter ([facility])    Leukocytosis    Acidosis    Hyperlipidemia    Closed displaced intertrochanteric fracture of right femur (HCC)    Alcohol abuse    Anemia associated with acute blood loss    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  The fracture is anatomically aligned.  At this time, I am recommending hardware removal with exchange nailing, utilizing a long cephalomedullary nail this time around.  The surgery and subsequent recovery, all risk, benefits alternatives treatment were reviewed.  These include but are not limited to loss of life, loss of limb, bleeding, infection, damage to surrounding structures and potential need for further surgery.  Informed consent obtained and verified, all questions answered.  Plan is for patient to stay overnight tonight.  If she does well with therapy, she would like to go home and complete outpatient therapy.  Formal operative note follow-up    Gonzalo Fuchs DO   7:08 AM  1/15/2024

## 2024-01-16 LAB
ERYTHROCYTE [DISTWIDTH] IN BLOOD BY AUTOMATED COUNT: 12.8 % (ref 11.5–15)
HCT VFR BLD AUTO: 31.1 % (ref 34–48)
HGB BLD-MCNC: 10.6 G/DL (ref 11.5–15.5)
MCH RBC QN AUTO: 32.8 PG (ref 26–35)
MCHC RBC AUTO-ENTMCNC: 34.1 G/DL (ref 32–34.5)
MCV RBC AUTO: 96.3 FL (ref 80–99.9)
MICROORGANISM SPEC CULT: NORMAL
PLATELET # BLD AUTO: 200 K/UL (ref 130–450)
PMV BLD AUTO: 9.5 FL (ref 7–12)
RBC # BLD AUTO: 3.23 M/UL (ref 3.5–5.5)
SPECIMEN DESCRIPTION: NORMAL
WBC OTHER # BLD: 9.9 K/UL (ref 4.5–11.5)

## 2024-01-16 PROCEDURE — 6360000002 HC RX W HCPCS: Performed by: GENERAL ACUTE CARE HOSPITAL

## 2024-01-16 PROCEDURE — 2580000003 HC RX 258: Performed by: GENERAL ACUTE CARE HOSPITAL

## 2024-01-16 PROCEDURE — 85027 COMPLETE CBC AUTOMATED: CPT

## 2024-01-16 PROCEDURE — 97165 OT EVAL LOW COMPLEX 30 MIN: CPT

## 2024-01-16 PROCEDURE — 6370000000 HC RX 637 (ALT 250 FOR IP): Performed by: GENERAL ACUTE CARE HOSPITAL

## 2024-01-16 PROCEDURE — 99232 SBSQ HOSP IP/OBS MODERATE 35: CPT | Performed by: STUDENT IN AN ORGANIZED HEALTH CARE EDUCATION/TRAINING PROGRAM

## 2024-01-16 PROCEDURE — 97530 THERAPEUTIC ACTIVITIES: CPT

## 2024-01-16 PROCEDURE — 36415 COLL VENOUS BLD VENIPUNCTURE: CPT

## 2024-01-16 PROCEDURE — 97161 PT EVAL LOW COMPLEX 20 MIN: CPT

## 2024-01-16 PROCEDURE — 1200000000 HC SEMI PRIVATE

## 2024-01-16 RX ORDER — OXYCODONE HYDROCHLORIDE 10 MG/1
10 TABLET ORAL EVERY 4 HOURS PRN
Qty: 30 TABLET | Refills: 0 | Status: SHIPPED | OUTPATIENT
Start: 2024-01-16 | End: 2024-01-23

## 2024-01-16 RX ADMIN — OXYCODONE 10 MG: 5 TABLET ORAL at 10:01

## 2024-01-16 RX ADMIN — SODIUM CHLORIDE, PRESERVATIVE FREE 10 ML: 5 INJECTION INTRAVENOUS at 21:22

## 2024-01-16 RX ADMIN — SODIUM CHLORIDE, PRESERVATIVE FREE 10 ML: 5 INJECTION INTRAVENOUS at 10:03

## 2024-01-16 RX ADMIN — OXYCODONE 10 MG: 5 TABLET ORAL at 21:34

## 2024-01-16 RX ADMIN — CALCIUM 500 MG: 500 TABLET ORAL at 10:00

## 2024-01-16 RX ADMIN — ASPIRIN 325 MG: 325 TABLET, COATED ORAL at 10:00

## 2024-01-16 RX ADMIN — WATER 2000 MG: 1 INJECTION INTRAMUSCULAR; INTRAVENOUS; SUBCUTANEOUS at 01:57

## 2024-01-16 RX ADMIN — SODIUM CHLORIDE, PRESERVATIVE FREE 10 ML: 5 INJECTION INTRAVENOUS at 00:31

## 2024-01-16 RX ADMIN — ASPIRIN 325 MG: 325 TABLET, COATED ORAL at 21:21

## 2024-01-16 RX ADMIN — ATORVASTATIN CALCIUM 20 MG: 20 TABLET, FILM COATED ORAL at 21:21

## 2024-01-16 RX ADMIN — OXYCODONE 10 MG: 5 TABLET ORAL at 00:29

## 2024-01-16 RX ADMIN — Medication 10 ML: at 02:12

## 2024-01-16 ASSESSMENT — PAIN DESCRIPTION - LOCATION
LOCATION: KNEE
LOCATION: HIP
LOCATION: HIP

## 2024-01-16 ASSESSMENT — PAIN DESCRIPTION - ORIENTATION
ORIENTATION: RIGHT
ORIENTATION: RIGHT

## 2024-01-16 ASSESSMENT — PAIN SCALES - GENERAL
PAINLEVEL_OUTOF10: 8
PAINLEVEL_OUTOF10: 9
PAINLEVEL_OUTOF10: 9
PAINLEVEL_OUTOF10: 6

## 2024-01-16 ASSESSMENT — PAIN DESCRIPTION - PAIN TYPE: TYPE: SURGICAL PAIN

## 2024-01-16 ASSESSMENT — PAIN DESCRIPTION - DESCRIPTORS
DESCRIPTORS: DISCOMFORT;SORE;ACHING
DESCRIPTORS: DISCOMFORT;SORE;ACHING
DESCRIPTORS: ACHING

## 2024-01-16 NOTE — CARE COORDINATION
Updated plan of care. Pt did not work with therapies on 1/15 d/t numbness and held by RN. Will work with her this am. [facility] is following-orders completed. Plan is home. Will follow-zachariah

## 2024-01-16 NOTE — DISCHARGE INSTRUCTIONS
Maintain postoperative dressing  Okay to shower with dressing in place  Weightbearing as tolerated, use wheeled walker for balance  Take medications as directed  Call the office with questions or concerns  Follow-up in 2 weeks

## 2024-01-17 VITALS
HEART RATE: 75 BPM | HEIGHT: 65 IN | WEIGHT: 140 LBS | BODY MASS INDEX: 23.32 KG/M2 | OXYGEN SATURATION: 98 % | DIASTOLIC BLOOD PRESSURE: 47 MMHG | TEMPERATURE: 98.4 F | SYSTOLIC BLOOD PRESSURE: 133 MMHG | RESPIRATION RATE: 18 BRPM

## 2024-01-17 LAB
EKG ATRIAL RATE: 66 BPM
EKG P AXIS: 50 DEGREES
EKG P-R INTERVAL: 132 MS
EKG Q-T INTERVAL: 414 MS
EKG QRS DURATION: 78 MS
EKG QTC CALCULATION (BAZETT): 434 MS
EKG R AXIS: 61 DEGREES
EKG T AXIS: 54 DEGREES
EKG VENTRICULAR RATE: 66 BPM
SURGICAL PATHOLOGY REPORT: NORMAL

## 2024-01-17 PROCEDURE — 97110 THERAPEUTIC EXERCISES: CPT

## 2024-01-17 PROCEDURE — 99232 SBSQ HOSP IP/OBS MODERATE 35: CPT | Performed by: STUDENT IN AN ORGANIZED HEALTH CARE EDUCATION/TRAINING PROGRAM

## 2024-01-17 PROCEDURE — 6370000000 HC RX 637 (ALT 250 FOR IP): Performed by: GENERAL ACUTE CARE HOSPITAL

## 2024-01-17 PROCEDURE — 2580000003 HC RX 258: Performed by: GENERAL ACUTE CARE HOSPITAL

## 2024-01-17 PROCEDURE — 97535 SELF CARE MNGMENT TRAINING: CPT

## 2024-01-17 PROCEDURE — 97530 THERAPEUTIC ACTIVITIES: CPT

## 2024-01-17 RX ORDER — POLYETHYLENE GLYCOL 3350 17 G/17G
17 POWDER, FOR SOLUTION ORAL DAILY PRN
Qty: 510 G | Refills: 0 | Status: SHIPPED | OUTPATIENT
Start: 2024-01-17 | End: 2024-02-16

## 2024-01-17 RX ADMIN — ASPIRIN 325 MG: 325 TABLET, COATED ORAL at 10:08

## 2024-01-17 RX ADMIN — CALCIUM 500 MG: 500 TABLET ORAL at 10:08

## 2024-01-17 RX ADMIN — SODIUM CHLORIDE, PRESERVATIVE FREE 10 ML: 5 INJECTION INTRAVENOUS at 10:12

## 2024-01-17 ASSESSMENT — PAIN SCALES - GENERAL: PAINLEVEL_OUTOF10: 6

## 2024-01-17 NOTE — PROGRESS NOTES
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 No other numbness or tingling anywhere else. No further speech difficulty, states her mouth was very dry when that happened before. Granddaughter at bedside.      ROS: denies fever, chills, cp, sob, n/v, HA unless stated above.     sodium chloride flush  5-40 mL IntraVENous 2 times per day    atorvastatin  20 mg Oral Daily    calcium elemental  500 mg Oral Daily    sodium chloride flush  5-40 mL IntraVENous 2 times per day    polyethylene glycol  17 g Oral Daily    aspirin  325 mg Oral BID     sodium chloride flush, 5-40 mL, PRN  sodium chloride, , PRN  potassium chloride, 40 mEq, PRN   Or  potassium alternative oral replacement, 40 mEq, PRN   Or  potassium chloride, 10 mEq, PRN  magnesium sulfate, 2,000 mg, PRN  polyethylene glycol, 17 g, Daily PRN  ondansetron, 4 mg, Q8H PRN   Or  ondansetron, 4 mg, Q6H PRN  sodium chloride flush, 5-40 mL, PRN  sodium chloride, , PRN  oxyCODONE, 5 mg, Q4H PRN   Or  oxyCODONE, 10 mg, Q4H PRN  morphine, 2 mg, Q2H PRN   Or  morphine, 4 mg, Q2H PRN  traMADol, 50 mg, Q6H PRN         Objective:    BP (!) 128/54   Pulse 74   Temp 98.1 °F (36.7 °C) (Oral)   Resp 18   Ht 1.651 m (5' 5\")   Wt 63.5 kg (140 lb)   SpO2 98%   BMI 23.30 kg/m²     General Appearance: alert and oriented x3 in no acute distress  Skin: warm and dry, no rash or erythema, post-surgical dressing R hip, appears c/d/i  Head: normocephalic and atraumatic  Eyes: pupils equal, round, extraocular eye movements intact, conjunctivae normal  Neck: supple and non-tender without mass, no cervical 
       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 No new symptoms.      ROS: denies fever, chills, cp, sob, n/v, HA unless stated above.     sodium chloride flush  5-40 mL IntraVENous 2 times per day    atorvastatin  20 mg Oral Daily    calcium elemental  500 mg Oral Daily    sodium chloride flush  5-40 mL IntraVENous 2 times per day    polyethylene glycol  17 g Oral Daily    aspirin  325 mg Oral BID     sodium chloride flush, 5-40 mL, PRN  sodium chloride, , PRN  potassium chloride, 40 mEq, PRN   Or  potassium alternative oral replacement, 40 mEq, PRN   Or  potassium chloride, 10 mEq, PRN  magnesium sulfate, 2,000 mg, PRN  polyethylene glycol, 17 g, Daily PRN  ondansetron, 4 mg, Q8H PRN   Or  ondansetron, 4 mg, Q6H PRN  sodium chloride flush, 5-40 mL, PRN  sodium chloride, , PRN  oxyCODONE, 5 mg, Q4H PRN   Or  oxyCODONE, 10 mg, Q4H PRN  morphine, 2 mg, Q2H PRN   Or  morphine, 4 mg, Q2H PRN  traMADol, 50 mg, Q6H PRN         Objective:    BP (!) 133/47   Pulse 75   Temp 98.4 °F (36.9 °C) (Oral)   Resp 18   Ht 1.651 m (5' 5\")   Wt 63.5 kg (140 lb)   SpO2 98%   BMI 23.30 kg/m²     General Appearance: alert and oriented x3 in no acute distress  Skin: warm and dry, no rash or erythema, post-surgical dressing R hip, appears c/d/i  Head: normocephalic and atraumatic  Eyes: pupils equal, round, extraocular eye movements intact, conjunctivae normal  Neck: supple and non-tender without mass, no cervical lymphadenopathy  Pulmonary/Chest: CTAB, no respiratory distress on 
4 Eyes Skin Assessment     NAME:  Amita Guerrero  YOB: 1950  MEDICAL RECORD NUMBER:  [id number]    The patient is being assessed for  Admission    I agree that at least one RN has performed a thorough Head to Toe Skin Assessment on the patient. ALL assessment sites listed below have been assessed.      Areas assessed by both nurses:    Head, Face, Ears, Shoulders, Back, Chest, Arms, Elbows, Hands, Sacrum. Buttock, Coccyx, Ischium, and Legs. Feet and Heels    3 hip incisions right         Does the Patient have a Wound? No noted wound(s)       Shun Prevention initiated by RN: Yes  Due to sensory impairment and limited mobility  Wound Care Orders initiated by RN: No    Pressure Injury (Stage 3,4, Unstageable, DTI, NWPT, and Complex wounds) if present, place Wound referral order by RN under : No    New Ostomies, if present place, Ostomy referral order under : No     Nurse 1 eSignature: Electronically signed by Dawn Phan RN on 1/15/24 at 6:47 PM EST    **SHARE this note so that the co-signing nurse can place an eSignature**    Nurse 2 eSignature: {Esignature:[id number]}    
Department of Orthopedic Surgery  Trauma / Fracture Care   Attending Progress Note        Subjective:  No complaints.  Patient resting comfortably in bed.  She does complain of some numbness to the bottom of her foot but states that her pain is well-controlled.    Vitals  VITALS:  BP (!) 128/54   Pulse 74   Temp 98.1 °F (36.7 °C) (Oral)   Resp 18   Ht 1.651 m (5' 5\")   Wt 63.5 kg (140 lb)   SpO2 98%   BMI 23.30 kg/m²     PHYSICAL EXAM:    Orientation:  alert and oriented to person, place and time    Right Lower Extremity    Compartments: soft      Incision:  dressing in place, clean, dry, and intact    Upper extremity Motor: 5/5 axillary, mc,m,r,u,ain,pin    Upper extremity sensory: grossly in tact    Lower Extremity Motor :   Quadriceps:  5/5  Extensor hallucis:  5/5  Dorsiflexion:  5/5  Plantarflexion:  5/5    Lower Extremity Sensory:  Tibial Nerve:  intact  Superficial Peroneal Nerve:  intact  Deep Peroneal Nerve:  intact    Pulses:    Posterior Tibial:  2  Dorsalis Pedis:  2  Posterior Tibial Artery:  2    Abnormal Exam findings:  none      LABS:    HgB:    Lab Results   Component Value Date    HGB 10.0 8/28/2011     INR:    No components found with this basename: PTPATIENT, PTINR     CBC:   Lab Results   Component Value Date/Time    WBC 9.9 01/16/2024 04:28 AM    RBC 3.23 01/16/2024 04:28 AM    HGB 10.6 01/16/2024 04:28 AM    HCT 31.1 01/16/2024 04:28 AM    MCV 96.3 01/16/2024 04:28 AM    MCH 32.8 01/16/2024 04:28 AM    MCHC 34.1 01/16/2024 04:28 AM    RDW 12.8 01/16/2024 04:28 AM     01/16/2024 04:28 AM    MPV 9.5 01/16/2024 04:28 AM       ASSESSMENT AND PLAN:    Post operative day 1 status post right hip removal of hardware with exchange nailing    1:  Weight bearing as tolerated  2:  Deep venous thrombosis prophylaxis -aspirin 325 twice daily  3:  Continue physical therapy  4:  D/C Plan:  Home Health  5:  F/U Plan              6: Patient overall is doing quite well today.  She does not seem to 
Dr. Jain notified of new consult.   
Occupational Therapy      Occupational Therapy referral received.  Hold at this time, d/t medical status  
Occupational Therapy    OCCUPATIONAL THERAPY INITIAL EVALUATION    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 [S72.141K]  Closed 2-part intertrochanteric fracture of proximal end of right femur with nonunion [S72.141K]  Surgery, elective [Z41.9]    Surgery:  right hip removal of hardware with exchange nailing    Pertinent Medical History: R hip ORIF 2022      Precautions:  Fall Risk, WBAT R LE      Assessment of current deficits    [x] Functional mobility  [x]ADLs  [x] Strength               [x]Cognition    [x] Functional transfers   [x] IADLs         [x] Safety Awareness   [x]Endurance    [] Fine Coordination              [x] Balance      [] Vision/perception   []Sensation     []Gross Motor Coordination  [] ROM  [] Delirium                   [] Motor Control     OT PLAN OF CARE   OT POC based on physician orders, patient diagnosis and results of clinical assessment    Frequency/Duration  2-4 days/wk for 2 weeks PRN   Specific OT Treatment Interventions to include:   ADL retraining/adapted techniques and AE recommendations to increase functional independence within precautions                    Energy conservation techniques to improve tolerance for selfcare routine   Functional transfer/mobility training/DME recommendations for increased independence, safety and fall prevention         Patient/family education to increase safety and functional independence             Environmental modifications for safe mobility and completion of ADLs                       
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 [S72.141K]  Closed 2-part intertrochanteric fracture of proximal end of right femur with nonunion [S72.141K]  Surgery, elective [Z41.9]    Surgery:  right hip removal of hardware with exchange nailing    Pertinent Medical History: R hip ORIF 2022      Precautions:  Fall Risk, WBAT R LE       Assessment of current deficits    [x] Functional mobility            [x]ADLs           [x] Strength                  [x]Cognition    [x] Functional transfers          [x] IADLs         [x] Safety Awareness   [x]Endurance    [] Fine Coordination                         [x] Balance      [] Vision/perception   []Sensation      []Gross Motor Coordination             [] ROM           [] Delirium                   [] Motor Control      OT PLAN OF CARE   OT POC based on physician orders, patient diagnosis and results of clinical assessment     Frequency/Duration  2-4 days/wk for 2 weeks PRN   Specific OT Treatment Interventions to include:   ADL retraining/adapted techniques and AE recommendations to increase functional independence within precautions                    Energy conservation techniques to improve tolerance for selfcare routine   Functional transfer/mobility training/DME recommendations for increased independence, safety and fall prevention         Patient/family education to increase safety and functional independence             Environmental modifications for safe mobility and completion of ADLs                             Therapeutic activity to improve functional performance during ADLs.                                
P Quality Flow/Interdisciplinary Rounds Progress Note        Quality Flow Rounds held on January 17, 2024    Disciplines Attending:  Bedside Nurse, , , and Nursing Unit Leadership    Amita Guerrero was admitted on 1/15/2024  5:09 AM    Anticipated Discharge Date:  Expected Discharge Date: 01/16/24    Disposition:    Shun Score:  Shun Scale Score: 19    Readmission Risk              Risk of Unplanned Readmission:  9           Discussed patient goal for the day, patient clinical progression, and barriers to discharge.  The following Goal(s) of the Day/Commitment(s) have been identified:   PT/OT, pain control, discharge planning      Christian Hickman RN  January 17, 2024        
Patient complaining of decreased sensation to RLE. Patient's pedal pulse 2+, warm to the touch and color is normal. When asked to wiggle toes, patient able to do so. Dr. Fuchs and anesthesia made aware. Okay to transfer to floor.   
Patient requested HARLEEN hose and PCD taken off her right leg due to discomfort  
[facility] PRE-ADMISSION TESTING INSTRUCTIONS    The Preadmission Testing patient is instructed accordingly using the following criteria (check applicable):    ARRIVAL INSTRUCTIONS:  [x] Parking the day of Surgery is located in the Main Entrance lot.  Upon entering the door, make an immediate right to the surgery reception desk    [x] Bring photo ID and insurance card    [] Bring in a copy of Living will or Durable Power of  papers.    [x] Please be sure to arrange for responsible adult to provide transportation to and from the hospital    [x] Please arrange for responsible adult to be with you for the 24 hour period post procedure due to having anesthesia    [x] If you awake am of surgery not feeling well or have temperature >100 please call [phone]    GENERAL INSTRUCTIONS:    [x] Nothing by mouth after midnight, including gum, candy, mints or water    [x] You may brush your teeth, but do not swallow any water    [] Take medications as instructed with 1-2 oz of water    [x] Stop herbal supplements and vitamins 5 days prior to procedure    [] Follow preop dosing of blood thinners per physician instructions    [] Take 1/2 dose of evening insulin, but no insulin after midnight    [] No oral diabetic medications after midnight    [] If diabetic and have low blood sugar or feel symptomatic, take 1-2oz apple juice only    [] Bring inhalers day of surgery    [] Bring C-PAP/ Bi-Pap day of surgery    [] Bring urine specimen day of surgery    [x] Shower or bath with soap, lather and rinse well, AM of Surgery, no lotion, powders or creams to surgical site    [] Follow bowel prep as instructed per surgeon    [x] No tobacco products within 24 hours of surgery     [x] No alcohol or illegal drug use within 24 hours of surgery.    [x] Jewelry, body piercing's, eyeglasses, contact lenses and dentures are not permitted into surgery (bring cases)      [x] Please do not wear any nail polish, 
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  Past Medical History:  has a past medical history of Hyperlipidemia, Prolonged emergence from general anesthesia, and Right hip pain.  Past Surgical History:  has a past surgical history that includes Neck mass excision (Left); Hip fracture surgery (Right, 2022); and Hip fracture surgery (Right, 1/15/2024).       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  Prior to admission pt walked with ww.      Initial Evaluation  Date:  2024  Treatment      Short Term/ Long Term   Goals   Was pt agreeable to Eval/treatment?  With encouragement      Does pt have pain?  R LE      Bed Mobility  Rolling:  NT   Supine to sit:  min assist   Sit to supine:  NT   Scooting:  SBA in sit    SBA    Transfers Sit to stand:  min assist   Stand to sit: min assist   Stand pivot:  NT    SBA    Ambulation     3  feet with  ww  with  min assist    100  feet with  ww  with  SBA        Stair negotiation: ascended and descended NT    4  steps with  1  rail with  CGA    LE ROM  Decreased throughout R hip, knee WFL , decreased ankle df   L LE WFL      LE strength  2- / 5 to 3-/ 5 hip, 3+/ 5 knee, 2-/ 5 ankle df   Increased by 1 MMT grade    AM- PAC RAW score       
Physical Therapy  PT eval held by RN . Will re-attempt at later date.     
Upon assessment of patient, patient reported difficulty speaking and difficulty feeling and moving both legs. Assessment completed and vital signs taken. Multiple heart rate drops into the 40s and 50s. Heart monitor placed on patient and CMR called.   Patient able to speak clearly to me. Patient reporting some feeling in both feet and weak dorsi/plantar flexion as of now.   Central heart monitoring in progress, hourly rounds will continue, follow up assessments as needed and with any change in status.   
strength  2- / 5 to 3-/ 5 hip, 3+/ 5 knee, 2-/ 5 ankle df   Increased by 1 MMT grade    AM- PAC RAW score   14/ 24 17/24      Pt is alert, following instruction  Balance: fair dynamic with WW    Pt performed therapeutic exercise of the following: supine B ankle pumps x 20, quad/glut sets x 10 AROM    Patient education/treatment  Pt was educated on UE usage for transfer safety, gait mechanics for posture, therapeutic exercise for circulation/strengthening/ROM, car transfer to back into the front passenger seat    Patient response to education:   Pt verbalized understanding Pt demonstrated skill Pt requires further education in this area   yes With instruction yes     ASSESSMENT:   Comments: Pt found in a bedside chair. Gait slow and consistent, step to pattern displayed throughout, no loss of balance or shortness of breath noted. Pt states concerned about her numbness in her R foot, was instruction R foot moving equal to the L presently. Granddaughter present, states Pt has a ramp and W/C to enter her home, was instructed to use this to assist Pt in the home. Car transfer discussed. Pt states had no further questions about home safety. Granddaughter concerned about Pt going home, was informed Pt require no hands on assist for balance.    Pt was left in bed per request with call light in reach    Time in 0926   Time out 0951   Total Treatment Time 25 minutes   CPT codes:     Therapeutic activities 39819 16 minutes   Therapeutic exercises 05672 9 minutes       Pt is making good progress toward established Physical Therapy goals.  Continue with physical therapy current plan of care. Pt in need of family support at home.    Jr Fuentes PTA   License Number: [id number]

## 2024-01-17 NOTE — PLAN OF CARE
Problem: Discharge Planning  Goal: Discharge to home or other facility with appropriate resources  1/17/2024 1128 by Anastasia Allen RN  Outcome: Progressing  1/17/2024 0536 by Ila Hudson RN  Outcome: Progressing     Problem: Pain  Goal: Verbalizes/displays adequate comfort level or baseline comfort level  1/17/2024 1128 by Anastasia Allen RN  Outcome: Progressing  1/17/2024 0536 by Ila Hudson RN  Outcome: Progressing     Problem: Safety - Adult  Goal: Free from fall injury  1/17/2024 1128 by Anastasia Allen RN  Outcome: Progressing  1/17/2024 0536 by Ila Hudson RN  Outcome: Progressing     Problem: ABCDS Injury Assessment  Goal: Absence of physical injury  1/17/2024 1128 by Anastasia Allen RN  Outcome: Progressing  1/17/2024 0536 by Ila Hudson RN  Outcome: Progressing     Problem: Skin/Tissue Integrity  Goal: Absence of new skin breakdown  Description: 1.  Monitor for areas of redness and/or skin breakdown  2.  Assess vascular access sites hourly  3.  Every 4-6 hours minimum:  Change oxygen saturation probe site  4.  Every 4-6 hours:  If on nasal continuous positive airway pressure, respiratory therapy assess nares and determine need for appliance change or resting period.  1/17/2024 1128 by Anastasia Allen RN  Outcome: Progressing  1/17/2024 0536 by Ila Hudson RN  Outcome: Progressing

## 2024-01-17 NOTE — CARE COORDINATION
Updated plan of care. POD#2. Pt has DME. Plan is home with [place] home [facility]-orders completed. Plan for discharge today-o

## 2024-01-17 NOTE — DISCHARGE SUMMARY
Discharge Summary     Patient ID:  Amita Guerrero  [id number]  73 y.o.  1950    Admit date: 1/15/2024    Discharge date and time: 1/17/2024    Admitting Physician: Gonzalo Fuchs DO     Discharge Physician: same    Admission Diagnoses: chronic nonunion, right intertrochanteric fracture    Discharge Diagnoses: same    Admission Condition: good    Discharged Condition: good    Procedure and Date: hardware removal with exchange nailing of right hip    Hospital Course: Patient underwent the above procedure as described.  Following the procedure, she was admitted for medical observation.  There were no apparent complications.  She underwent both PT/OT during her stay    Consults: Internal medicine    Significant Diagnostic Studies: Routine lab work, CT head as there was initial concern over post op TIA (ruled out/normal)    Discharge Exam:  BP (!) 133/47   Pulse 75   Temp 98.4 °F (36.9 °C) (Oral)   Resp 18   Ht 1.651 m (5' 5\")   Wt 63.5 kg (140 lb)   SpO2 98%   BMI 23.30 kg/m²     General Appearance:    Alert, cooperative, no distress, appears stated age   Head:    Normocephalic, without obvious abnormality, atraumatic   Eyes:    PERRL, conjunctiva/corneas clear, EOM's intact, fundi     benign, both eyes   Ears:    Normal TM's and external ear canals, both ears   Nose:   Nares normal, septum midline, mucosa normal, no drainage    or sinus tenderness   Throat:   Lips, mucosa, and tongue normal; teeth and gums normal   Neck:   Supple, symmetrical, trachea midline, no adenopathy;     thyroid:  no enlargement/tenderness/nodules; no carotid    bruit or JVD   Back:     Symmetric, no curvature, ROM normal, no CVA tenderness   Lungs:     Clear to auscultation bilaterally, respirations unlabored   Chest Wall:    No tenderness or deformity    Heart:    Regular rate and rhythm, S1 and S2 normal, no murmur, rub   or gallop   Breast Exam:    No tenderness, masses, or nipple abnormality   Abdomen:     Soft, non-tender,

## 2024-04-16 ENCOUNTER — APPOINTMENT (OUTPATIENT)
Dept: ULTRASOUND IMAGING | Age: 74
End: 2024-04-16
Payer: OTHER GOVERNMENT

## 2024-04-16 ENCOUNTER — HOSPITAL ENCOUNTER (EMERGENCY)
Age: 74
Discharge: LEFT BEFORE DISCHARGE | End: 2024-04-16
Attending: EMERGENCY MEDICINE
Payer: OTHER GOVERNMENT

## 2024-04-16 VITALS
OXYGEN SATURATION: 96 % | WEIGHT: 145 LBS | SYSTOLIC BLOOD PRESSURE: 197 MMHG | HEART RATE: 103 BPM | BODY MASS INDEX: 24.16 KG/M2 | TEMPERATURE: 98.1 F | RESPIRATION RATE: 18 BRPM | DIASTOLIC BLOOD PRESSURE: 105 MMHG | HEIGHT: 65 IN

## 2024-04-16 DIAGNOSIS — M79.89 LEG SWELLING: Primary | ICD-10-CM

## 2024-04-16 PROCEDURE — 93971 EXTREMITY STUDY: CPT

## 2024-04-16 PROCEDURE — 99284 EMERGENCY DEPT VISIT MOD MDM: CPT

## 2024-04-16 ASSESSMENT — LIFESTYLE VARIABLES: HOW OFTEN DO YOU HAVE A DRINK CONTAINING ALCOHOL: 2-3 TIMES A WEEK

## 2024-04-16 NOTE — ED PROVIDER NOTES
------------------------- NURSING NOTES AND VITALS REVIEWED ---------------------------   The nursing notes within the ED encounter and vital signs as below have been reviewed.   BP (!) 197/105   Pulse (!) 103   Temp 98.1 °F (36.7 °C) (Oral)   Resp 18   Ht 1.651 m (5' 5\")   Wt 65.8 kg (145 lb)   SpO2 96%   BMI 24.13 kg/m²   Oxygen Saturation Interpretation: Normal      ---------------------------------------------------PHYSICAL EXAM--------------------------------------      Constitutional/General: Alert and oriented x3, well appearing, non toxic in NAD  Head: Normocephalic and atraumatic  Eyes: PERRL, EOMI  Mouth: Oropharynx clear, handling secretions, no trismus  Neck: Supple, full ROM,   Pulmonary: Lungs clear to auscultation bilaterally, no wheezes, rales, or rhonchi. Not in respiratory distress  Cardiovascular:  Regular rate and rhythm, no murmurs, gallops, or rubs. 2+ distal pulses  Abdomen: Soft, non tender, non distended,   Extremities: Moves all extremities x 4. Warm and well perfused.  Right lower extremity: Dorsalis pedis 2+, foot is warm, no signs of ischemic limb or compartment syndrome, no obvious swelling to the right lower extremity, it is symmetrical with the left lower extremity  Skin: warm and dry without rash  Neurologic: GCS 15,  Psych: Normal Affect      ------------------------------ ED COURSE/MEDICAL DECISION MAKING----------------------  Medications - No data to display      ED COURSE:       Medical Decision Making:      Patient presents for leg swelling.  Concern for ischemic limb, DVT, edema, or other pathologies.  She had no gross abnormalities of her right lower extremity.  There is no signs of ischemic limb or compartment syndrome, she had a bounding pulse.    Ultrasound reviewed as above, no evidence of DVT    Chart reviewed, patient was admitted to hospital 1/15/2024 for right hip hardware placement    Went to reevaluate the patient, however, apparently she had eloped.

## 2024-06-12 ENCOUNTER — HOSPITAL ENCOUNTER (OUTPATIENT)
Dept: MAMMOGRAPHY | Age: 74
Discharge: HOME | End: 2024-06-14
Payer: OTHER GOVERNMENT

## 2024-06-12 DIAGNOSIS — Z13.820 ENCOUNTER FOR OSTEOPOROSIS SCREENING IN ASYMPTOMATIC POSTMENOPAUSAL PATIENT: ICD-10-CM

## 2024-06-12 DIAGNOSIS — Z78.0 ENCOUNTER FOR OSTEOPOROSIS SCREENING IN ASYMPTOMATIC POSTMENOPAUSAL PATIENT: ICD-10-CM

## 2024-06-12 PROCEDURE — 77080 DXA BONE DENSITY AXIAL: CPT

## (undated) DEVICE — BLADE ES L6IN ELASTOMERIC COAT EXT DURABLE BEND UPTO 90DEG

## (undated) DEVICE — WIPES SKIN CLOTH READYPREP 9 X 10.5 IN 2% CHLORHEX GLUCONATE CHG PREOP

## (undated) DEVICE — 4-PORT MANIFOLD: Brand: NEPTUNE 2

## (undated) DEVICE — SOLUTION IV IRRIG POUR BRL 0.9% SODIUM CHL 2F7124

## (undated) DEVICE — GLOVE ORANGE PI 8   MSG9080

## (undated) DEVICE — DRESSING HYDROFIBER AQUACEL AG ADVANTAGE 3.5X6 IN

## (undated) DEVICE — SPONGE LAP W18XL18IN WHT COT 4 PLY FLD STRUNG RADPQ DISP ST

## (undated) DEVICE — Device: Brand: PROTECTORS, LEG SPAR BALL JOINT, 12/PR

## (undated) DEVICE — ELECTRODE PT RET AD L9FT HI MOIST COND ADH HYDRGEL CORDED

## (undated) DEVICE — NEEDLE HYPO 22GA L1.5IN BLK POLYPR HUB S STL REG BVL STR

## (undated) DEVICE — SYRINGE,CONTROL,LL,FINGER,GRIP: Brand: MEDLINE INDUSTRIES, INC.

## (undated) DEVICE — 3M™ TEGADERM™ TRANSPARENT FILM DRESSING FRAME STYLE, 1626W, 4 IN X 4-3/4 IN (10 CM X 12 CM), 50/CT 4CT/CASE: Brand: 3M™ TEGADERM™

## (undated) DEVICE — COVER,BOOT,FOAM,NON-SKID,HOOK-LOOP,XLG: Brand: MEDLINE INDUSTRIES, INC.

## (undated) DEVICE — BIT DRL HLLW CANN PERC 16MM STRL

## (undated) DEVICE — BIT DRL L145MM DIA4.2MM ST 3 FLUT NDL PNT QUIK CPL FOR FEM

## (undated) DEVICE — Device

## (undated) DEVICE — TAPE ADH W3INXL10YD WHT COT WVN BK POWERFUL RUB BASE HIGHLY

## (undated) DEVICE — PADDING,UNDERCAST,COTTON, 4"X4YD STERILE: Brand: MEDLINE

## (undated) DEVICE — Device: Brand: COVER, PERINEAL POST, 12 PK

## (undated) DEVICE — BIT DRL L L266MM DIA16MM FEM FLX CANN QUIK CPL

## (undated) DEVICE — DRAPE ISOLATN PT ST W/POCKET

## (undated) DEVICE — NEEDLE,22GX1.5",REG,BEVEL: Brand: MEDLINE

## (undated) DEVICE — MARKER,SKIN,WI/RULER AND LABELS: Brand: MEDLINE

## (undated) DEVICE — C-ARM: Brand: UNBRANDED

## (undated) DEVICE — SPONGE LAP W18XL18IN WHT COT 4 PLY FLD STRUNG RADPQ DISP ST 2 PER PACK

## (undated) DEVICE — PAD PERINL POST FOAM DISPOSABLE FOR AMSCO SURG TBL

## (undated) DEVICE — DRESSING,GAUZE,XEROFORM,CURAD,1"X8",ST: Brand: CURAD

## (undated) DEVICE — GLOVE SURG SZ 8 L12IN FNGR THK79MIL GRN LTX FREE

## (undated) DEVICE — PACK PROCEDURE SURG GEN CUST

## (undated) DEVICE — PADDING CAST W6INXL4YD COT LO LINTING WYTEX

## (undated) DEVICE — ROD RMR L950MM DIA2.5MM W/ EXTN BALL TIP

## (undated) DEVICE — GUIDEWIRE ORTH L400MM DIA3.2MM FOR TFN

## (undated) DEVICE — BIT DRL L500MM DIA6X9MM CANN STP L QUIK CPL FOR DH DC TFN

## (undated) DEVICE — SYRINGE IRRIG 60ML SFT PLIABLE BLB EZ TO GRP 1 HND USE W/

## (undated) DEVICE — BIT DRL L330MM DIA4.2MM CALIB L100MM ST 3 FLUT QUIK CPL FOR

## (undated) DEVICE — BANDAGE,GAUZE,BULKEE II,4.5"X4.1YD,STRL: Brand: MEDLINE

## (undated) DEVICE — CHLORAPREP 26ML ORANGE

## (undated) DEVICE — BLADE,STAINLESS-STEEL,10,STRL,DISPOSABLE: Brand: MEDLINE

## (undated) DEVICE — GAUZE,SPONGE,4"X4",8PLY,STRL,LF,10/TRAY: Brand: MEDLINE

## (undated) DEVICE — DOUBLE BASIN SET: Brand: MEDLINE INDUSTRIES, INC.

## (undated) DEVICE — APPLICATOR MEDICATED 26 CC SOLUTION HI LT ORNG CHLORAPREP

## (undated) DEVICE — 3M™ COBAN™ NL STERILE NON-LATEX SELF-ADHERENT WRAP, 2084S, 4 IN X 5 YD (10 CM X 4,5 M), 18 ROLLS/CASE: Brand: 3M™ COBAN™